# Patient Record
Sex: FEMALE | Race: WHITE | NOT HISPANIC OR LATINO | Employment: OTHER | URBAN - METROPOLITAN AREA
[De-identification: names, ages, dates, MRNs, and addresses within clinical notes are randomized per-mention and may not be internally consistent; named-entity substitution may affect disease eponyms.]

---

## 2019-10-08 NOTE — PROGRESS NOTES
Cardiology Office Visit      Encounter Date:  10/10/2019    Patient ID:   Didi Ceballos is a 74 y.o. female.    Reason For Followup:  CAD  HLD  HTN  HTCVD    Brief Clinical History:  Dear Patience Sanchez    I had the pleasure of seeing Didi Ceballos today. As you are well aware, this is a 74 y.o. female with a known history of coronary occlusive disease. She underwent emergent PTCA and stenting secondary to acute inferior wall myocardial infarction in the past. She has additional history that includes dyslipidemia, hypertension with hypertensive cardiovascular disease, tobacco abuse disorder, and antiplatelet therapy. She presents today for routine followup above conditions.    Interval History:  She denies any chest pain pressure heaviness or tightness she denies any shortness of breath. She denies any syncope or near syncope. She reports feeling quite well from a cardiac perspective.    She reports that she has been reluctant to get routine health screening such as colonoscopies, and Pap smears.  She states she has gotten mammograms.  We discussed the importance of these preventative measures.  She states she will reach out to you to get scheduled.    We reviewed her most recent laboratory values that were obtained in January of this year.    Assessment & Plan    Impressions:  Coronary artery disease status post PTCA and drug-eluting stent placement in the right coronary artery with 2 XIENCE V drug-eluting stents.  Residual coronary arterial disease involving the circumflex. There are 2-70% lesions noted in a branch with acute angulation.  Dyslipidemia  Antiplatelet therapy.  Hypertension with hypertensive cardiovascular disease. Suboptimally controlled today.  Rash of uncertain etiology.  dermatology feels is drug exanthem.   Tobacco abuse disorder    Recommendations:  Continuation of her current medical regimen at the present time.  Followup in 6 months time sooner should she have any difficulties.  Smoking  cessation.  Basic labs annually including CBC Chem-7 lipids and LFTs.  Patient was encouraged to get routine preventative screenings.       Objective:    Vitals:  Vitals:    10/10/19 0917   BP: 134/81   Pulse: 77   Resp: 18   SpO2: 96%   Weight: 68.8 kg (151 lb 9.6 oz)       Physical Exam:    General: Alert, cooperative, no distress, appears stated age  Head:  Normocephalic, atraumatic, mucous membranes moist  Eyes:  Conjunctiva/corneas clear, EOM's intact     Neck:  Supple,  no adenopathy;      Lungs: Coarse and diminished at the bases  Chest wall: No tenderness  Heart::  Regular rate and rhythm, S1 and S2 normal, 1/6 holosystolic murmur.  No rub or gallop  Abdomen: Soft, non-tender, nondistended bowel sounds active  Extremities: No cyanosis, clubbing, or edema  Pulses: 2+ and symmetric all extremities  Skin:  No rashes or lesions  Neuro/psych: A&O x3. CN II through XII are grossly intact with appropriate affect      Allergies:  No Known Allergies    Medication Review:     Current Outpatient Medications:   •  aspirin 81 MG tablet, Take 81 mg by mouth Daily., Disp: , Rfl:   •  clopidogrel (PLAVIX) 75 MG tablet, Take 75 mg by mouth Daily., Disp: , Rfl: 3  •  metoprolol succinate XL (TOPROL-XL) 25 MG 24 hr tablet, Take 1 tablet by mouth Daily., Disp: , Rfl:   •  rosuvastatin (CRESTOR) 10 MG tablet, Take 10 mg by mouth Daily., Disp: , Rfl: 3    Family History:  Family History   Problem Relation Age of Onset   • No Known Problems Mother    • Heart attack Father    • Heart attack Sister    • Heart attack Brother        Past Medical History:  Past Medical History:   Diagnosis Date   • Antiplatelet or antithrombotic long-term use    • Coronary artery disease    • Dyslipidemia    • Hypertension    • Hypertensive cardiovascular disease    • Kidney stones    • Myocardial infarction (CMS/HCC)        Past surgical History:  Past Surgical History:   Procedure Laterality Date   • BACK SURGERY     • CORONARY ANGIOPLASTY WITH STENT  PLACEMENT      RCA- 2 Xience drug eluting stents   • KIDNEY STONE SURGERY      x 2       Social History:  Social History     Socioeconomic History   • Marital status:      Spouse name: Not on file   • Number of children: Not on file   • Years of education: Not on file   • Highest education level: Not on file   Tobacco Use   • Smoking status: Current Every Day Smoker     Packs/day: 1.00     Years: 18.00     Pack years: 18.00     Types: Cigarettes   • Smokeless tobacco: Never Used   Substance and Sexual Activity   • Alcohol use: No     Frequency: Never   • Drug use: No       Review of Systems:  The following systems were reviewed as they relate to the cardiovascular system: Constitutional, Eyes, ENT, Cardiovascular, Respiratory, Gastrointestinal, Integumentary, Neurological, Psychiatric, Hematologic, Endocrine, Musculoskeletal, and Genitourinary. The pertinent cardiovascular findings are reported above with all other cardiovascular points within those systems being negative.    Diagnostic Study Review:     Current Electrocardiogram:    ECG 12 Lead  Date/Time: 10/10/2019 9:52 AM  Performed by: Sushil Cantu DO  Authorized by: Sushil Cantu DO   Comparison: not compared with previous ECG   Previous ECG: no previous ECG available  Comments: Normal sinus rhythm with a ventricular rate of 71 bpm.  Ventricular premature complex.  Consider anterolateral MI.  Normal QT and QTc intervals.  Left axis deviation.              NOTE: The following portions of the patient's history were reviewed and updated this visit as appropriate: allergies, current medications, past family history, past medical history, past social history, past surgical history and problem list.

## 2019-10-10 ENCOUNTER — OFFICE VISIT (OUTPATIENT)
Dept: CARDIOLOGY | Facility: CLINIC | Age: 74
End: 2019-10-10

## 2019-10-10 VITALS
HEART RATE: 77 BPM | WEIGHT: 151.6 LBS | SYSTOLIC BLOOD PRESSURE: 134 MMHG | DIASTOLIC BLOOD PRESSURE: 81 MMHG | RESPIRATION RATE: 18 BRPM | BODY MASS INDEX: 27.73 KG/M2 | OXYGEN SATURATION: 96 %

## 2019-10-10 DIAGNOSIS — I10 ESSENTIAL HYPERTENSION: ICD-10-CM

## 2019-10-10 DIAGNOSIS — I25.10 CORONARY ARTERY DISEASE INVOLVING NATIVE CORONARY ARTERY OF NATIVE HEART WITHOUT ANGINA PECTORIS: Primary | ICD-10-CM

## 2019-10-10 DIAGNOSIS — Z79.02 LONG TERM CURRENT USE OF ANTITHROMBOTICS/ANTIPLATELETS: ICD-10-CM

## 2019-10-10 DIAGNOSIS — F17.200 TOBACCO DEPENDENCE SYNDROME: ICD-10-CM

## 2019-10-10 DIAGNOSIS — E78.00 HYPERCHOLESTEROLEMIA: ICD-10-CM

## 2019-10-10 DIAGNOSIS — I11.9 HYPERTENSIVE HEART DISEASE WITHOUT HEART FAILURE: ICD-10-CM

## 2019-10-10 DIAGNOSIS — E78.5 DYSLIPIDEMIA: ICD-10-CM

## 2019-10-10 PROCEDURE — 99214 OFFICE O/P EST MOD 30 MIN: CPT | Performed by: INTERNAL MEDICINE

## 2019-10-10 PROCEDURE — 93000 ELECTROCARDIOGRAM COMPLETE: CPT | Performed by: INTERNAL MEDICINE

## 2019-10-10 RX ORDER — CLOPIDOGREL BISULFATE 75 MG/1
75 TABLET ORAL DAILY
Refills: 3 | COMMUNITY
Start: 2019-09-09 | End: 2020-06-15

## 2019-10-10 RX ORDER — ROSUVASTATIN CALCIUM 10 MG/1
10 TABLET, COATED ORAL DAILY
Refills: 3 | COMMUNITY
Start: 2019-09-09 | End: 2020-06-15

## 2019-10-10 RX ORDER — METOPROLOL SUCCINATE 25 MG/1
1 TABLET, EXTENDED RELEASE ORAL EVERY 24 HOURS
COMMUNITY
Start: 2019-02-26 | End: 2020-06-15

## 2020-04-15 NOTE — PROGRESS NOTES
Cardiology Office Visit      Encounter Date:  04/16/2020    Patient ID:   Didi Ceballos is a 74 y.o. female.    Reason For Followup:  CAD  HLD  HTN  HTCVD    Brief Clinical History:  Dear Patience Shaver APRN    I had the pleasure of seeing Didi Ceballos today. As you are well aware, this is a 74 y.o. female with a known history of coronary occlusive disease. She underwent emergent PTCA and stenting secondary to acute inferior wall myocardial infarction in the past. She has additional history that includes dyslipidemia, hypertension with hypertensive cardiovascular disease, tobacco abuse disorder, and antiplatelet therapy. She presents today for routine followup above conditions.     Interval History:  She denies any chest pain pressure heaviness or tightness she denies any shortness of breath. She denies any syncope or near syncope. She reports feeling quite well from a cardiac perspective.     She continues to report being reluctant to get routine health screening such as colonoscopies, and Pap smears.  She states she has gotten mammograms.  We discussed the importance of these preventative measures.  She states she will reach out to you to get scheduled once the coronavirus outbreak has subsided.    Her blood pressure is elevated today however she reports her home value this morning was 118/78.     Assessment & Plan     Impressions:  Coronary artery disease status post PTCA and drug-eluting stent placement in the right coronary artery with 2 XIENCE V drug-eluting stents.  Residual coronary arterial disease involving the circumflex. There are 2-70% lesions noted in a branch with acute angulation.  Dyslipidemia  Antiplatelet therapy.  Hypertension with hypertensive cardiovascular disease. Suboptimally controlled today.  Rash of uncertain etiology.  dermatology feels is drug exanthem.   Tobacco abuse disorder     Recommendations:  Continuation of her current medical regimen at the present time.  Followup in 6  months time sooner should she have any difficulties.  Smoking cessation.  Basic labs annually including CBC Chem-7 lipids and LFTs.  Patient was encouraged to get routine preventative screenings.       Objective:    Vitals:  Vitals:    04/16/20 0904   BP: 144/84   Pulse: 73   SpO2: 97%   Weight: 68.5 kg (151 lb)       Physical Exam:    General:          Alert, cooperative, no distress, appears stated age  Head:              Normocephalic, atraumatic, mucous membranes moist  Eyes:               Conjunctiva/corneas clear, EOM's intact     Neck:              Supple,  no adenopathy;      Lungs:            Coarse and diminished at the bases  Chest wall:     No tenderness  Heart::             Regular rate and rhythm, S1 and S2 normal, 1/6 holosystolic murmur.  No rub or gallop  Abdomen:      Soft, non-tender, nondistended bowel sounds active  Extremities:    No cyanosis, clubbing, or edema  Pulses:           2+ and symmetric all extremities  Skin:                No rashes or lesions  Neuro/psych: A&O x3. CN II through XII are grossly intact with appropriate affect      Allergies:  No Known Allergies    Medication Review:     Current Outpatient Medications:   •  aspirin 81 MG tablet, Take 81 mg by mouth Daily., Disp: , Rfl:   •  clopidogrel (PLAVIX) 75 MG tablet, Take 75 mg by mouth Daily., Disp: , Rfl: 3  •  metoprolol succinate XL (TOPROL-XL) 25 MG 24 hr tablet, Take 1 tablet by mouth Daily., Disp: , Rfl:   •  rosuvastatin (CRESTOR) 10 MG tablet, Take 10 mg by mouth Daily., Disp: , Rfl: 3    Family History:  Family History   Problem Relation Age of Onset   • No Known Problems Mother    • Heart attack Father    • Heart attack Sister    • Heart attack Brother        Past Medical History:  Past Medical History:   Diagnosis Date   • Antiplatelet or antithrombotic long-term use    • Coronary artery disease    • Dyslipidemia    • Hypertension    • Hypertensive cardiovascular disease    • Kidney stones    • Myocardial  infarction (CMS/HCC)        Past surgical History:  Past Surgical History:   Procedure Laterality Date   • BACK SURGERY     • CORONARY ANGIOPLASTY WITH STENT PLACEMENT      RCA- 2 Xience drug eluting stents   • KIDNEY STONE SURGERY      x 2       Social History:  Social History     Socioeconomic History   • Marital status:      Spouse name: Not on file   • Number of children: Not on file   • Years of education: Not on file   • Highest education level: Not on file   Tobacco Use   • Smoking status: Current Every Day Smoker     Packs/day: 1.00     Years: 18.00     Pack years: 18.00     Types: Cigarettes   • Smokeless tobacco: Never Used   Substance and Sexual Activity   • Alcohol use: No     Frequency: Never   • Drug use: No       Review of Systems:  The following systems were reviewed as they relate to the cardiovascular system: Constitutional, Eyes, ENT, Cardiovascular, Respiratory, Gastrointestinal, Integumentary, Neurological, Psychiatric, Hematologic, Endocrine, Musculoskeletal, and Genitourinary. The pertinent cardiovascular findings are reported above with all other cardiovascular points within those systems being negative.    Diagnostic Study Review:     Current Electrocardiogram:    ECG 12 Lead  Date/Time: 4/16/2020 9:53 AM  Performed by: Sushil Cantu DO  Authorized by: Sushil Cantu DO   Comparison: not compared with previous ECG   Previous ECG: no previous ECG available  Comments: Normal sinus rhythm with a ventricular rate of 73 bpm.  PVC.  Consider left atrial enlargement.  Consider old inferior MI.  Normal QT and QTc intervals.  Normal QRS axis.              NOTE: The following portions of the patient's history were reviewed and updated this visit as appropriate: allergies, current medications, past family history, past medical history, past social history, past surgical history and problem list.

## 2020-04-16 ENCOUNTER — OFFICE VISIT (OUTPATIENT)
Dept: CARDIOLOGY | Facility: CLINIC | Age: 75
End: 2020-04-16

## 2020-04-16 VITALS
WEIGHT: 151 LBS | BODY MASS INDEX: 27.62 KG/M2 | DIASTOLIC BLOOD PRESSURE: 84 MMHG | HEART RATE: 73 BPM | OXYGEN SATURATION: 97 % | SYSTOLIC BLOOD PRESSURE: 144 MMHG

## 2020-04-16 DIAGNOSIS — E78.00 HYPERCHOLESTEROLEMIA: ICD-10-CM

## 2020-04-16 DIAGNOSIS — I10 ESSENTIAL HYPERTENSION: ICD-10-CM

## 2020-04-16 DIAGNOSIS — F17.200 TOBACCO DEPENDENCE SYNDROME: ICD-10-CM

## 2020-04-16 DIAGNOSIS — Z79.02 LONG TERM CURRENT USE OF ANTITHROMBOTICS/ANTIPLATELETS: ICD-10-CM

## 2020-04-16 DIAGNOSIS — I11.9 HYPERTENSIVE HEART DISEASE WITHOUT HEART FAILURE: ICD-10-CM

## 2020-04-16 DIAGNOSIS — I25.10 CORONARY ARTERY DISEASE INVOLVING NATIVE CORONARY ARTERY OF NATIVE HEART WITHOUT ANGINA PECTORIS: Primary | ICD-10-CM

## 2020-04-16 PROCEDURE — 99214 OFFICE O/P EST MOD 30 MIN: CPT | Performed by: INTERNAL MEDICINE

## 2020-06-15 RX ORDER — CLOPIDOGREL BISULFATE 75 MG/1
TABLET ORAL
Qty: 90 TABLET | Refills: 1 | Status: SHIPPED | OUTPATIENT
Start: 2020-06-15 | End: 2020-12-21

## 2020-06-15 RX ORDER — METOPROLOL SUCCINATE 25 MG/1
TABLET, EXTENDED RELEASE ORAL
Qty: 90 TABLET | Refills: 1 | Status: SHIPPED | OUTPATIENT
Start: 2020-06-15 | End: 2020-12-21

## 2020-06-15 RX ORDER — ROSUVASTATIN CALCIUM 10 MG/1
TABLET, COATED ORAL
Qty: 90 TABLET | Refills: 1 | Status: SHIPPED | OUTPATIENT
Start: 2020-06-15 | End: 2020-12-21

## 2020-10-19 PROBLEM — Z72.0 CURRENT TOBACCO USE: Status: ACTIVE | Noted: 2020-10-19

## 2020-10-26 NOTE — PROGRESS NOTES
Cardiology Office Visit      Encounter Date:  10/27/2020    Patient ID:   Didi Ceballos is a 75 y.o. female.    Reason For Followup:  CAD  HLD  HTN  HTCVD    Brief Clinical History:  Dear Patience Shaver APRN    I had the pleasure of seeing Didi Ceballos today. As you are well aware, this is a 75 y.o. female with a known history of coronary occlusive disease. She underwent emergent PTCA and stenting secondary to acute inferior wall myocardial infarction in the past. She has additional history that includes dyslipidemia, hypertension with hypertensive cardiovascular disease, tobacco abuse disorder, and antiplatelet therapy. She presents today for routine followup above conditions.     Interval History:  She denies any chest pain pressure heaviness or tightness she denies any shortness of breath. She denies any syncope or near syncope. She reports feeling quite well from a cardiac perspective.     She continues to report being reluctant to get routine health screening such as colonoscopies, and Pap smears.  She states she has gotten mammograms.  We have discussed the importance of these preventative measures in the past.    Her blood pressure is elevated in the office today however this morning she reports her systolic blood pressure was 97.  She reports no recent blood work.  She continues to smoke three quarters of a pack a day.  We discussed the importance of smoking cessation.     Assessment & Plan     Impressions:  Coronary artery disease status post PTCA and drug-eluting stent placement in the right coronary artery with 2 XIENCE V drug-eluting stents.  Residual coronary arterial disease involving the circumflex. There are 2-70% lesions noted in a branch with acute angulation.  Dyslipidemia  Antiplatelet therapy.  Hypertension with hypertensive cardiovascular disease. Suboptimally controlled today.  Rash of uncertain etiology.  dermatology feels is drug exanthem.   Tobacco abuse  disorder     Recommendations:  Continuation of her current medical regimen at the present time.  Followup in 6 months time sooner should she have any difficulties.  Smoking cessation.  Basic labs annually including CBC Chem-7 lipids and LFTs.  Patient was encouraged to get routine preventative screenings.       Objective:    Vitals:  Vitals:    10/27/20 0935   BP: 149/80   Pulse: 77   SpO2: 97%   Weight: 68.5 kg (151 lb)       Physical Exam:    General:          Alert, cooperative, no distress, appears stated age  Head:              Normocephalic, atraumatic, mucous membranes moist  Eyes:               Conjunctiva/corneas clear, EOM's intact     Neck:              Supple,  no bruit  Lungs:            Coarse and diminished at the bases  Chest wall:     No tenderness  Heart::             Regular rate and rhythm, S1 and S2 normal, 1/6 holosystolic murmur.  No rub or gallop  Abdomen:      Soft, non-tender, nondistended bowel sounds active  Extremities:    No cyanosis, clubbing, or edema  Pulses:           2+ and symmetric all extremities  Skin:                No rashes or lesions  Neuro/psych: A&O x3. CN II through XII are grossly intact with appropriate affect      Allergies:  No Known Allergies    Medication Review:     Current Outpatient Medications:   •  aspirin 81 MG tablet, Take 81 mg by mouth Daily., Disp: , Rfl:   •  clopidogrel (PLAVIX) 75 MG tablet, Take 1 tablet by mouth once daily, Disp: 90 tablet, Rfl: 1  •  metoprolol succinate XL (TOPROL-XL) 25 MG 24 hr tablet, Take 1 tablet by mouth once daily, Disp: 90 tablet, Rfl: 1  •  rosuvastatin (CRESTOR) 10 MG tablet, Take 1 tablet by mouth once daily, Disp: 90 tablet, Rfl: 1    Family History:  Family History   Problem Relation Age of Onset   • No Known Problems Mother    • Heart attack Father    • Heart attack Sister    • Heart attack Brother        Past Medical History:  Past Medical History:   Diagnosis Date   • Antiplatelet or antithrombotic long-term use     • Coronary artery disease    • Dyslipidemia    • Hypertension    • Hypertensive cardiovascular disease    • Kidney stones    • Myocardial infarction (CMS/HCC)        Past surgical History:  Past Surgical History:   Procedure Laterality Date   • BACK SURGERY     • CORONARY ANGIOPLASTY WITH STENT PLACEMENT      RCA- 2 Xience drug eluting stents   • KIDNEY STONE SURGERY      x 2       Social History:  Social History     Socioeconomic History   • Marital status:      Spouse name: Not on file   • Number of children: Not on file   • Years of education: Not on file   • Highest education level: Not on file   Tobacco Use   • Smoking status: Current Every Day Smoker     Packs/day: 1.00     Years: 18.00     Pack years: 18.00     Types: Cigarettes   • Smokeless tobacco: Never Used   Substance and Sexual Activity   • Alcohol use: No     Frequency: Never   • Drug use: No       Review of Systems:  The following systems were reviewed as they relate to the cardiovascular system: Constitutional, Eyes, ENT, Cardiovascular, Respiratory, Gastrointestinal, Integumentary, Neurological, Psychiatric, Hematologic, Endocrine, Musculoskeletal, and Genitourinary. The pertinent cardiovascular findings are reported above with all other cardiovascular points within those systems being negative.    Diagnostic Study Review:     Current Electrocardiogram:    ECG 12 Lead    Date/Time: 10/27/2020 10:03 AM  Performed by: Sushil Cantu DO  Authorized by: Sushil Cantu DO   Comparison: not compared with previous ECG   Previous ECG: no previous ECG available  Comments: Normal sinus rhythm with a ventricular rate of 83 bpm.  Low voltage in the precordial leads.  Normal QT and QTc intervals.  Normal QRS axis.              NOTE: The following portions of the patient's history were reviewed and updated this visit as appropriate: allergies, current medications, past family history, past medical history, past social  history, past surgical history and problem list.

## 2020-10-27 ENCOUNTER — OFFICE VISIT (OUTPATIENT)
Dept: CARDIOLOGY | Facility: CLINIC | Age: 75
End: 2020-10-27

## 2020-10-27 VITALS
WEIGHT: 151 LBS | BODY MASS INDEX: 27.62 KG/M2 | SYSTOLIC BLOOD PRESSURE: 149 MMHG | HEART RATE: 77 BPM | DIASTOLIC BLOOD PRESSURE: 80 MMHG | OXYGEN SATURATION: 97 %

## 2020-10-27 DIAGNOSIS — E78.00 HYPERCHOLESTEROLEMIA: ICD-10-CM

## 2020-10-27 DIAGNOSIS — Z79.02 LONG TERM CURRENT USE OF ANTITHROMBOTICS/ANTIPLATELETS: ICD-10-CM

## 2020-10-27 DIAGNOSIS — Z00.00 WELLNESS EXAMINATION: ICD-10-CM

## 2020-10-27 DIAGNOSIS — E78.5 DYSLIPIDEMIA: ICD-10-CM

## 2020-10-27 DIAGNOSIS — I10 ESSENTIAL HYPERTENSION: ICD-10-CM

## 2020-10-27 DIAGNOSIS — I11.9 HYPERTENSIVE HEART DISEASE WITHOUT HEART FAILURE: ICD-10-CM

## 2020-10-27 DIAGNOSIS — I25.10 CORONARY ARTERY DISEASE INVOLVING NATIVE CORONARY ARTERY OF NATIVE HEART WITHOUT ANGINA PECTORIS: Primary | ICD-10-CM

## 2020-10-27 DIAGNOSIS — F17.200 TOBACCO DEPENDENCE SYNDROME: ICD-10-CM

## 2020-10-27 PROCEDURE — 99214 OFFICE O/P EST MOD 30 MIN: CPT | Performed by: INTERNAL MEDICINE

## 2020-10-27 PROCEDURE — 93000 ELECTROCARDIOGRAM COMPLETE: CPT | Performed by: INTERNAL MEDICINE

## 2020-12-21 RX ORDER — CLOPIDOGREL BISULFATE 75 MG/1
TABLET ORAL
Qty: 90 TABLET | Refills: 1 | Status: SHIPPED | OUTPATIENT
Start: 2020-12-21 | End: 2021-06-29

## 2020-12-21 RX ORDER — ROSUVASTATIN CALCIUM 10 MG/1
TABLET, COATED ORAL
Qty: 90 TABLET | Refills: 1 | Status: SHIPPED | OUTPATIENT
Start: 2020-12-21 | End: 2021-06-29

## 2020-12-21 RX ORDER — METOPROLOL SUCCINATE 25 MG/1
TABLET, EXTENDED RELEASE ORAL
Qty: 90 TABLET | Refills: 1 | Status: SHIPPED | OUTPATIENT
Start: 2020-12-21 | End: 2021-06-29

## 2021-04-27 ENCOUNTER — OFFICE VISIT (OUTPATIENT)
Dept: CARDIOLOGY | Facility: CLINIC | Age: 76
End: 2021-04-27

## 2021-04-27 VITALS
HEIGHT: 62 IN | SYSTOLIC BLOOD PRESSURE: 137 MMHG | HEART RATE: 70 BPM | RESPIRATION RATE: 18 BRPM | BODY MASS INDEX: 28.16 KG/M2 | WEIGHT: 153 LBS | OXYGEN SATURATION: 97 % | DIASTOLIC BLOOD PRESSURE: 74 MMHG

## 2021-04-27 DIAGNOSIS — I11.9 HYPERTENSIVE HEART DISEASE WITHOUT HEART FAILURE: ICD-10-CM

## 2021-04-27 DIAGNOSIS — I10 ESSENTIAL HYPERTENSION: ICD-10-CM

## 2021-04-27 DIAGNOSIS — I25.10 CORONARY ARTERY DISEASE INVOLVING NATIVE CORONARY ARTERY OF NATIVE HEART WITHOUT ANGINA PECTORIS: Primary | ICD-10-CM

## 2021-04-27 DIAGNOSIS — G62.9 NEUROPATHY: ICD-10-CM

## 2021-04-27 DIAGNOSIS — E78.5 DYSLIPIDEMIA: ICD-10-CM

## 2021-04-27 DIAGNOSIS — Z79.02 LONG TERM CURRENT USE OF ANTITHROMBOTICS/ANTIPLATELETS: ICD-10-CM

## 2021-04-27 PROCEDURE — 93000 ELECTROCARDIOGRAM COMPLETE: CPT | Performed by: INTERNAL MEDICINE

## 2021-04-27 PROCEDURE — 99214 OFFICE O/P EST MOD 30 MIN: CPT | Performed by: INTERNAL MEDICINE

## 2021-04-27 NOTE — PROGRESS NOTES
Cardiology Office Visit      Encounter Date:  04/27/2021    Patient ID:   Didi Ceballos is a 75 y.o. female.    Reason For Followup:  Coronary artery disease  Hypertension  Hyperlipidemia  Hypertensive cardiovascular disease     Brief Clinical History:  Dear Patience Shaver APRN    I had the pleasure of seeing Didi Ceballos today. As you are well aware, this is a 75 y.o. female with a known history of coronary occlusive disease. She underwent emergent PTCA and stenting secondary to acute inferior wall myocardial infarction in the past. She has additional history that includes dyslipidemia, hypertension with hypertensive cardiovascular disease, tobacco abuse disorder, and antiplatelet therapy. She presents today for routine followup above conditions.     Interval History:  She denies any chest pain pressure heaviness or tightness she denies any shortness of breath. She denies any syncope or near syncope. She reports feeling quite well from a cardiac perspective.     Her blood pressure is better today than it has been on previous visits.  She reports good control at home.    She is reporting some numbness in the thumb, index finger, and middle fingers on both hands.  We will make a referral to neurology for nerve conduction studies.    We reviewed her most recent laboratory data from October 2020.  Renal function is within normal limits.     Assessment & Plan     Impressions:  Coronary artery disease status post PTCA and drug-eluting stent placement in the right coronary artery with 2 XIENCE V drug-eluting stents.  Residual coronary arterial disease involving the circumflex. There are 2-70% lesions noted in a branch with acute angulation.  Dyslipidemia  Antiplatelet therapy.  Hypertension with hypertensive cardiovascular disease. Suboptimally controlled today.  Rash of uncertain etiology.  dermatology feels is drug exanthem.   Tobacco abuse disorder  Bilateral hand numbness     Recommendations:  Continuation of her  "current medical regimen  Neurology referral  Follow-up in 6 months time sooner should there be difficulties.       Objective:    Vitals:  Vitals:    04/27/21 1026   BP: 137/74   BP Location: Left arm   Patient Position: Sitting   Cuff Size: Large Adult   Pulse: 70   Resp: 18   SpO2: 97%   Weight: 69.4 kg (153 lb)   Height: 157.5 cm (62\")       Physical Exam:    General:          Alert, cooperative, no distress, appears stated age  Head:              Normocephalic, atraumatic, mucous membranes moist  Eyes:               Conjunctiva/corneas clear, EOM's intact     Neck:              Supple,  no bruit  Lungs:            Coarse and diminished at the bases  Chest wall:     No tenderness  Heart::             Regular rate and rhythm, S1 and S2 normal, 1/6 holosystolic murmur.  No rub or gallop  Abdomen:      Soft, non-tender, nondistended bowel sounds active  Extremities:    No cyanosis, clubbing, or edema  Pulses:           2+ and symmetric all extremities  Skin:                No rashes or lesions  Neuro/psych: A&O x3. CN II through XII are grossly intact with appropriate affect      Allergies:  No Known Allergies    Medication Review:     Current Outpatient Medications:   •  aspirin 81 MG tablet, Take 81 mg by mouth Daily., Disp: , Rfl:   •  clopidogrel (PLAVIX) 75 MG tablet, Take 1 tablet by mouth once daily, Disp: 90 tablet, Rfl: 1  •  metoprolol succinate XL (TOPROL-XL) 25 MG 24 hr tablet, Take 1 tablet by mouth once daily, Disp: 90 tablet, Rfl: 1  •  rosuvastatin (CRESTOR) 10 MG tablet, Take 1 tablet by mouth once daily, Disp: 90 tablet, Rfl: 1    Family History:  Family History   Problem Relation Age of Onset   • No Known Problems Mother    • Heart attack Father    • Heart attack Sister    • Heart attack Brother        Past Medical History:  Past Medical History:   Diagnosis Date   • Antiplatelet or antithrombotic long-term use    • Coronary artery disease    • Dyslipidemia    • Hypertension    • Hypertensive " cardiovascular disease    • Kidney stones    • Myocardial infarction (CMS/HCC)        Past surgical History:  Past Surgical History:   Procedure Laterality Date   • BACK SURGERY     • CORONARY ANGIOPLASTY WITH STENT PLACEMENT      RCA- 2 Xience drug eluting stents   • KIDNEY STONE SURGERY      x 2       Social History:  Social History     Socioeconomic History   • Marital status:      Spouse name: Not on file   • Number of children: Not on file   • Years of education: Not on file   • Highest education level: Not on file   Tobacco Use   • Smoking status: Current Every Day Smoker     Packs/day: 1.00     Years: 18.00     Pack years: 18.00     Types: Cigarettes   • Smokeless tobacco: Never Used   Vaping Use   • Vaping Use: Never used   Substance and Sexual Activity   • Alcohol use: No   • Drug use: No       Review of Systems:  The following systems were reviewed as they relate to the cardiovascular system: Constitutional, Eyes, ENT, Cardiovascular, Respiratory, Gastrointestinal, Integumentary, Neurological, Psychiatric, Hematologic, Endocrine, Musculoskeletal, and Genitourinary. The pertinent cardiovascular findings are reported above with all other cardiovascular points within those systems being negative.    Diagnostic Study Review:     Current Electrocardiogram:    ECG 12 Lead    Date/Time: 4/27/2021 12:53 PM  Performed by: Sushil Cantu DO  Authorized by: Sushil Cantu DO   Comparison: not compared with previous ECG   Previous ECG: no previous ECG available  Comments: Normal sinus rhythm with a ventricular rate of 71 bpm.  Borderline right axis deviation.  Low voltage in the precordial leads.  Normal QT and QTc intervals.              NOTE: The following portions of the patient's note were reviewed, confirmed and/or updated this visit as appropriate: History of present illness/Interval history, physical examination, assessment & plan, allergies, current medications, past family  history, past medical history, past social history, past surgical history and problem list.

## 2021-06-15 NOTE — PROGRESS NOTES
Subjective: neuropathy    Patient ID: Didi Ceballos is a 75 y.o. female.    CHIEF COMPLAINT: neuropathy    History of Present Illness Ms Ceballos is a very pleasant 75-year-old  female who presented to the clinic for an evaluation of numbness and tingling in both hands.  She reports that her left hand is much more severe than her right.  She reports difficulty picking up cups and small items and reports burning tingling type discomfort in bilateral thumbs index and ring fingers.  She denies any neck pain whatsoever.  She states the discomfort is worse at night and sometimes interferes with her sleep.  She reports the discomfort is gotten progressively worse and if needed she will have surgery.    Complaint: paresthesias  Onset: Left hand onset 1 year, right hand onset 6 months.  Location: thumb,index finger, and middle fingers on both hands, L>R   Quality: feels like-numbness/pain  Severity: 4 day -- 10 at night on scale of 0-10  Duration: couple minutes  Frequency: random  Timing: random  Context: hanging down movement  Modifying factors: hanging down   Associated Signs and symptoms:  numbness,  muscle waisting APB on left hand   Current meds:none    Back  Lumbar OR 77, 78  ocassional flair up on Sciatica    The following portions of the patient's history were reviewed and updated as appropriate: allergies, current medications, past family history, past medical history, past social history, past surgical history and problem list.      Family History   Problem Relation Age of Onset   • No Known Problems Mother    • Heart attack Father    • Heart attack Sister    • Heart attack Brother        Past Medical History:   Diagnosis Date   • Antiplatelet or antithrombotic long-term use    • Coronary artery disease    • Dyslipidemia    • Hypertension    • Hypertensive cardiovascular disease    • Kidney stones    • Myocardial infarction (CMS/[facility])        Social History     Socioeconomic History   • Marital status:       Spouse name: Not on file   • Number of children: Not on file   • Years of education: Not on file   • Highest education level: Not on file   Tobacco Use   • Smoking status: Current Every Day Smoker     Packs/day: 1.00     Years: 18.00     Pack years: 18.00     Types: Cigarettes   • Smokeless tobacco: Never Used   Vaping Use   • Vaping Use: Never used   Substance and Sexual Activity   • Alcohol use: No   • Drug use: No   • Sexual activity: Defer         Current Outpatient Medications:   •  aspirin 81 MG tablet, Take 81 mg by mouth Daily., Disp: , Rfl:   •  clopidogrel (PLAVIX) 75 MG tablet, Take 1 tablet by mouth once daily, Disp: 90 tablet, Rfl: 1  •  metoprolol succinate XL (TOPROL-XL) 25 MG 24 hr tablet, Take 1 tablet by mouth once daily, Disp: 90 tablet, Rfl: 1  •  rosuvastatin (CRESTOR) 10 MG tablet, Take 1 tablet by mouth once daily, Disp: 90 tablet, Rfl: 1    Review of Systems   Constitutional: Negative for fatigue and fever.   HENT: Negative for ear discharge and ear pain.    Eyes: Negative for pain and itching.   Respiratory: Negative for cough and shortness of breath.    Cardiovascular: Negative for chest pain.   Gastrointestinal: Negative for abdominal pain and nausea.   Endocrine: Negative for cold intolerance and heat intolerance.   Genitourinary: Negative for urgency.   Musculoskeletal: Positive for back pain. Negative for neck pain.   Neurological: Negative for dizziness and headaches.   Psychiatric/Behavioral: Negative for agitation and confusion.        I have reviewed ROS completed by medical assistant.     Objective:    Neurologic Exam     Mental Status   Oriented to person, place, and time.     Cranial Nerves     CN III, IV, VI   Pupils are equal, round, and reactive to light.    Gait, Coordination, and Reflexes     Gait  Gait: normal    Reflexes   Right brachioradialis: 2+  Left brachioradialis: 2+  Right biceps: 2+  Left biceps: 2+  Right triceps: 2+  Left triceps: 2+  Right patellar:  2+  Left patellar: 2+  Right achilles: 2+  Left achilles: 2+      Physical Exam  Vitals and nursing note reviewed.   Constitutional:       Appearance: Normal appearance.   HENT:      Head: Normocephalic.      Nose: Nose normal.      Mouth/Throat:      Mouth: Mucous membranes are moist.   Eyes:      Pupils: Pupils are equal, round, and reactive to light.   Cardiovascular:      Rate and Rhythm: Normal rate and regular rhythm.      Pulses: Normal pulses.      Heart sounds: Normal heart sounds, S1 normal and S2 normal.   Pulmonary:      Effort: Pulmonary effort is normal.      Breath sounds: Normal breath sounds.   Musculoskeletal:         General: Normal range of motion.      Cervical back: Normal range of motion.   Skin:     General: Skin is warm.   Neurological:      General: No focal deficit present.      Mental Status: She is alert and oriented to person, place, and time.      Cranial Nerves: Cranial nerves are intact.      Sensory: Sensation is intact.      Motor: Motor function is intact.      Coordination: Coordination is intact.      Gait: Gait is intact.      Deep Tendon Reflexes: Babinski sign absent on the right side. Babinski sign absent on the left side.      Reflex Scores:       Tricep reflexes are 2+ on the right side and 2+ on the left side.       Bicep reflexes are 2+ on the right side and 2+ on the left side.       Brachioradialis reflexes are 2+ on the right side and 2+ on the left side.       Patellar reflexes are 2+ on the right side and 2+ on the left side.       Achilles reflexes are 2+ on the right side and 2+ on the left side.  Psychiatric:         Attention and Perception: Attention and perception normal.         Mood and Affect: Mood normal.         Behavior: Behavior normal. Behavior is cooperative.         Thought Content: Thought content normal.         Judgment: Judgment normal.         Assessment/Plan:    Diagnoses and all orders for this visit:    1. Neuropathy of both upper extremities  (Primary)  Comments:  Neuropathy labs not ordered in entirety due to liklehood of CTS.   Orders:  -     EMG 2 Limbs; Future  -     Vitamin B12  -     CBC & Differential  -     RPR  -     Sedimentation Rate  -     Protein Elec + Interp, Serum        Return in about 3 months (around 9/22/2021) for Next scheduled follow up with Dr Seipel .    I spent 49 minutes caring for Didi on this date of service. This time includes time spent by me in the following activities: obtaining and/or reviewing a separately obtained history, performing a medically appropriate examination and/or evaluation, counseling and educating the patient/family/caregiver, ordering medications, tests, or procedures, referring and communicating with other health care professionals, documenting information in the medical record and independently interpreting results and communicating that information with the patient/family/caregiver.      This document has been electronically signed by Evi DANIEL on June 22, 2021 09:33 EDT

## 2021-06-22 ENCOUNTER — OFFICE VISIT (OUTPATIENT)
Dept: NEUROLOGY | Facility: CLINIC | Age: 76
End: 2021-06-22

## 2021-06-22 VITALS
WEIGHT: 151 LBS | HEIGHT: 62 IN | TEMPERATURE: 96.9 F | DIASTOLIC BLOOD PRESSURE: 83 MMHG | SYSTOLIC BLOOD PRESSURE: 144 MMHG | BODY MASS INDEX: 27.79 KG/M2 | HEART RATE: 80 BPM

## 2021-06-22 DIAGNOSIS — G56.93 NEUROPATHY OF BOTH UPPER EXTREMITIES: Primary | ICD-10-CM

## 2021-06-22 PROCEDURE — 99204 OFFICE O/P NEW MOD 45 MIN: CPT | Performed by: NURSE PRACTITIONER

## 2021-06-29 RX ORDER — METOPROLOL SUCCINATE 25 MG/1
TABLET, EXTENDED RELEASE ORAL
Qty: 90 TABLET | Refills: 0 | Status: SHIPPED | OUTPATIENT
Start: 2021-06-29 | End: 2021-10-01

## 2021-06-29 RX ORDER — ROSUVASTATIN CALCIUM 10 MG/1
TABLET, COATED ORAL
Qty: 90 TABLET | Refills: 0 | Status: SHIPPED | OUTPATIENT
Start: 2021-06-29 | End: 2021-10-01

## 2021-06-29 RX ORDER — CLOPIDOGREL BISULFATE 75 MG/1
TABLET ORAL
Qty: 90 TABLET | Refills: 0 | Status: SHIPPED | OUTPATIENT
Start: 2021-06-29 | End: 2021-10-01

## 2021-07-13 NOTE — PROGRESS NOTES
EMG and Nerve Conduction Studies    The complete report includes the data sheets.     Referring Doctor: ANGELIC BEST    History: This patient is a 76-year-old female who complains of numbness in both hands worse on the left than the right.  There is atrophy of the thenar eminence on the left      Results:    1.  The right median sensory nerve conduction study was attempted with no response recorded.  The right median motor distal latency is prolonged the compound muscle action potential amplitudes are normal the conduction velocity in the forearm is mildly reduced.    2.  The left median sensory study was attempted with no response recorded.  The left median motor distal latency is prolonged the amplitude of the compound muscle action potential is markedly reduced the forearm conduction velocity was normal.    3.  The ulnar sensory distal latency is slightly prolonged bilaterally consistent with a skin temperature the ulnar motor nerve conduction studies were normal.    4.  EMG of the muscles examined in the upper extremities in the C5-6 T1 myotomes were normal except for the abductor pollicis brevis muscles.  The left abductor pollicis brevis muscle and increased insertional activity fibrillations positive sharp waves and a discrete motor unit pattern.  The right abductor pollicis brevis muscle showed increased insertional activity and decreased recruitment.      Impression:    This is an abnormal study.  There is evidence of bilateral median neuropathy at the wrist.  The neuropathy is moderate to severe on the right and severe on the left.  There is no evidence of focal ulnar neuropathy or neurogenic change in the muscles examined in the C5-T1 myotomes except for the median innervated abductor pollicis brevis muscle.      Joseph Seipel, M.D.

## 2021-07-26 ENCOUNTER — PROCEDURE VISIT (OUTPATIENT)
Dept: NEUROLOGY | Facility: CLINIC | Age: 76
End: 2021-07-26

## 2021-07-26 VITALS
HEART RATE: 77 BPM | HEIGHT: 62 IN | TEMPERATURE: 97.5 F | SYSTOLIC BLOOD PRESSURE: 132 MMHG | BODY MASS INDEX: 27.79 KG/M2 | WEIGHT: 151 LBS | DIASTOLIC BLOOD PRESSURE: 80 MMHG

## 2021-07-26 DIAGNOSIS — G56.03 BILATERAL CARPAL TUNNEL SYNDROME: Primary | ICD-10-CM

## 2021-07-26 PROCEDURE — 95886 MUSC TEST DONE W/N TEST COMP: CPT | Performed by: PSYCHIATRY & NEUROLOGY

## 2021-07-26 PROCEDURE — 95910 NRV CNDJ TEST 7-8 STUDIES: CPT | Performed by: PSYCHIATRY & NEUROLOGY

## 2021-07-27 ENCOUNTER — TELEPHONE (OUTPATIENT)
Dept: NEUROLOGY | Facility: CLINIC | Age: 76
End: 2021-07-27

## 2021-07-27 NOTE — PROGRESS NOTES
Notify her that th EMG showed Bilateral Carpal Tunnel and I can refer her to [facility] if she wishes.   Impression:     This is an abnormal study.  There is evidence of bilateral median neuropathy at the wrist.  The neuropathy is moderate to severe on the right and severe on the left.  There is no evidence of focal ulnar neuropathy or neurogenic change in the muscles examined in the C5-T1 myotomes except for the median innervated abductor pollicis brevis muscle.        Joseph Seipel, M.D

## 2021-07-27 NOTE — TELEPHONE ENCOUNTER
----- Message from FRANDY Jara sent at 7/27/2021  1:58 PM EDT -----  Notify her that th EMG showed Bilateral Carpal Tunnel and I can refer her to [facility] if she wishes.   Impression:     This is an abnormal study.  There is evidence of bilateral median neuropathy at the wrist.  The neuropathy is moderate to severe on the right and severe on the left.  There is no evidence of focal ulnar neuropathy or neurogenic change in the muscles   examined in the C5-T1 myotomes except for the median innervated abductor pollicis brevis muscle.        Joseph Seipel, M.D

## 2021-08-27 ENCOUNTER — TELEPHONE (OUTPATIENT)
Dept: NEUROLOGY | Facility: CLINIC | Age: 76
End: 2021-08-27

## 2021-08-27 NOTE — TELEPHONE ENCOUNTER
Caller: Didi Ceballos    Relationship: Self    Best call back number: [phone]    What is the medical concern/diagnosis: BILATERAL CARPAL TUNNEL    What specialty or service is being requested: HAND SPECIALIST    What is the provider, practice or medical service name: [facility]    Any additional details: PT CALLED BACK REQUESTING EMG/NCV RESULTS AS SHE STATED SHE HAD NOT HEARD FROM THE OFFICE. INFORMED HER THAT JAMESON HAD TRIED REACHING OUT TWICE WITH NO SUCCESS. RELAYED RESULTS AS OKAYED BY JAMESON. PT VERBALIZED UNDERSTANDING AND WOULD LIKE TO GO FORWARD WITH REFERRAL TO HAND SPECIALIST.    PLEASE REVIEW AND ADVISE.

## 2021-08-31 DIAGNOSIS — G56.03 BILATERAL CARPAL TUNNEL SYNDROME: Primary | ICD-10-CM

## 2021-10-01 RX ORDER — ROSUVASTATIN CALCIUM 10 MG/1
TABLET, COATED ORAL
Qty: 90 TABLET | Refills: 0 | Status: SHIPPED | OUTPATIENT
Start: 2021-10-01 | End: 2021-12-30

## 2021-10-01 RX ORDER — CLOPIDOGREL BISULFATE 75 MG/1
TABLET ORAL
Qty: 90 TABLET | Refills: 0 | Status: SHIPPED | OUTPATIENT
Start: 2021-10-01 | End: 2021-12-30

## 2021-10-01 RX ORDER — METOPROLOL SUCCINATE 25 MG/1
TABLET, EXTENDED RELEASE ORAL
Qty: 90 TABLET | Refills: 0 | Status: SHIPPED | OUTPATIENT
Start: 2021-10-01 | End: 2021-12-30

## 2021-10-27 ENCOUNTER — OFFICE VISIT (OUTPATIENT)
Dept: CARDIOLOGY | Facility: CLINIC | Age: 76
End: 2021-10-27

## 2021-10-27 VITALS
OXYGEN SATURATION: 96 % | DIASTOLIC BLOOD PRESSURE: 83 MMHG | WEIGHT: 154 LBS | HEART RATE: 81 BPM | HEIGHT: 62 IN | SYSTOLIC BLOOD PRESSURE: 148 MMHG | BODY MASS INDEX: 28.34 KG/M2

## 2021-10-27 DIAGNOSIS — I25.10 CORONARY ARTERY DISEASE INVOLVING NATIVE CORONARY ARTERY OF NATIVE HEART WITHOUT ANGINA PECTORIS: Primary | ICD-10-CM

## 2021-10-27 DIAGNOSIS — I11.9 HYPERTENSIVE HEART DISEASE WITHOUT HEART FAILURE: ICD-10-CM

## 2021-10-27 DIAGNOSIS — E78.00 HYPERCHOLESTEROLEMIA: ICD-10-CM

## 2021-10-27 DIAGNOSIS — I10 PRIMARY HYPERTENSION: ICD-10-CM

## 2021-10-27 DIAGNOSIS — Z79.02 LONG TERM CURRENT USE OF ANTITHROMBOTICS/ANTIPLATELETS: ICD-10-CM

## 2021-10-27 PROCEDURE — 93000 ELECTROCARDIOGRAM COMPLETE: CPT | Performed by: INTERNAL MEDICINE

## 2021-10-27 PROCEDURE — 99214 OFFICE O/P EST MOD 30 MIN: CPT | Performed by: INTERNAL MEDICINE

## 2021-12-30 RX ORDER — CLOPIDOGREL BISULFATE 75 MG/1
TABLET ORAL
Qty: 90 TABLET | Refills: 0 | Status: SHIPPED | OUTPATIENT
Start: 2021-12-30 | End: 2022-04-04

## 2021-12-30 RX ORDER — ROSUVASTATIN CALCIUM 10 MG/1
TABLET, COATED ORAL
Qty: 90 TABLET | Refills: 0 | Status: SHIPPED | OUTPATIENT
Start: 2021-12-30 | End: 2022-04-04

## 2021-12-30 RX ORDER — METOPROLOL SUCCINATE 25 MG/1
TABLET, EXTENDED RELEASE ORAL
Qty: 90 TABLET | Refills: 0 | Status: SHIPPED | OUTPATIENT
Start: 2021-12-30 | End: 2022-04-04

## 2022-04-04 RX ORDER — CLOPIDOGREL BISULFATE 75 MG/1
TABLET ORAL
Qty: 90 TABLET | Refills: 0 | Status: SHIPPED | OUTPATIENT
Start: 2022-04-04 | End: 2022-06-30

## 2022-04-04 RX ORDER — METOPROLOL SUCCINATE 25 MG/1
TABLET, EXTENDED RELEASE ORAL
Qty: 90 TABLET | Refills: 0 | Status: SHIPPED | OUTPATIENT
Start: 2022-04-04 | End: 2022-06-30

## 2022-04-04 RX ORDER — ROSUVASTATIN CALCIUM 10 MG/1
TABLET, COATED ORAL
Qty: 90 TABLET | Refills: 0 | Status: SHIPPED | OUTPATIENT
Start: 2022-04-04 | End: 2022-06-30

## 2022-04-27 ENCOUNTER — OFFICE VISIT (OUTPATIENT)
Dept: CARDIOLOGY | Facility: CLINIC | Age: 77
End: 2022-04-27

## 2022-04-27 VITALS
DIASTOLIC BLOOD PRESSURE: 78 MMHG | OXYGEN SATURATION: 95 % | BODY MASS INDEX: 28.3 KG/M2 | WEIGHT: 153.8 LBS | HEIGHT: 62 IN | SYSTOLIC BLOOD PRESSURE: 132 MMHG | HEART RATE: 76 BPM

## 2022-04-27 DIAGNOSIS — I10 PRIMARY HYPERTENSION: ICD-10-CM

## 2022-04-27 DIAGNOSIS — I25.10 CORONARY ARTERY DISEASE INVOLVING NATIVE CORONARY ARTERY OF NATIVE HEART WITHOUT ANGINA PECTORIS: Primary | ICD-10-CM

## 2022-04-27 DIAGNOSIS — Z79.02 LONG TERM CURRENT USE OF ANTITHROMBOTICS/ANTIPLATELETS: ICD-10-CM

## 2022-04-27 DIAGNOSIS — E78.00 HYPERCHOLESTEROLEMIA: ICD-10-CM

## 2022-04-27 DIAGNOSIS — I11.9 HYPERTENSIVE HEART DISEASE WITHOUT HEART FAILURE: ICD-10-CM

## 2022-04-27 PROCEDURE — 93000 ELECTROCARDIOGRAM COMPLETE: CPT | Performed by: INTERNAL MEDICINE

## 2022-04-27 PROCEDURE — 99214 OFFICE O/P EST MOD 30 MIN: CPT | Performed by: INTERNAL MEDICINE

## 2022-04-27 NOTE — PROGRESS NOTES
Cardiology Office Visit      Encounter Date:  04/27/2022    Patient ID:   Didi Ceballos is a 76 y.o. female.    Reason For Followup:  Coronary artery disease  Hypertension  Hyperlipidemia  Hypertensive cardiovascular disease     Brief Clinical History:  Dear Patience Shaver APRN    I had the pleasure of seeing Didi Ceballos today. As you are well aware, this is a 76 y.o. female with a known history of coronary occlusive disease. She underwent emergent PTCA and stenting secondary to acute inferior wall myocardial infarction in the past. She has additional history that includes dyslipidemia, hypertension with hypertensive cardiovascular disease, tobacco abuse disorder, and antiplatelet therapy. She presents today for routine followup above conditions.     Interval History:  She denies any chest pain pressure heaviness or tightness she denies any shortness of breath. She denies any syncope or near syncope. She reports feeling quite well from a cardiac perspective.    She continues to smoke.  She reports that she is under a pack a day consistently and some days she is about a half a pack a day.    She had carpal tunnel release in December and reports she has had some improvement although there still is some numbness in her fingers.    She had labs performed in November 2021.  We reviewed those today.  Hemoglobin and hematocrit are 16.4 and 49.8 respectively.  Potassium was 4.7.  BUN and creatinine are 15 and 0.9 respectively.  Hepatic function panel was within normal limits.  Total cholesterol 162, triglycerides 121, LDL 84, HDL 54.      Assessment & Plan     Impressions:  Coronary artery disease status post PTCA and drug-eluting stent placement in the right coronary artery with 2 XIENCE drug-eluting stents.  Residual coronary arterial disease involving the circumflex. There are 2-70% lesions noted in a branch with acute angulation.  Dyslipidemia  Antiplatelet therapy.  Hypertension with hypertensive cardiovascular  "disease. Suboptimally controlled today.  Tobacco abuse disorder  Bilateral hand numbness s/p carpel tunnel release     Recommendations:  Continuation of her current medical regimen     This includes antiplatelet, antihypertensives, and statin  Follow-up in 6 months time sooner should there be difficulties.    Diagnoses and all orders for this visit:    1. Coronary artery disease involving native coronary artery of native heart without angina pectoris (Primary)  -     ECG 12 Lead    2. Hypercholesterolemia  -     ECG 12 Lead    3. Primary hypertension  -     ECG 12 Lead    4. Hypertensive heart disease without heart failure  -     ECG 12 Lead    5. Long term current use of antithrombotics/antiplatelets  -     ECG 12 Lead          Objective:    Vitals:  Vitals:    04/27/22 0953   BP: 132/78   Pulse: 76   SpO2: 95%   Weight: 69.8 kg (153 lb 12.8 oz)   Height: 157.5 cm (62\")     Body mass index is 28.13 kg/m².      Physical Exam:    General: Alert, cooperative, no distress, appears stated age  Head:  Normocephalic, atraumatic, mucous membranes moist  Eyes:  Conjunctiva/corneas clear, EOM's intact     Neck:  Supple,  no bruit    Lungs: Clear to auscultation bilaterally, no wheezes rhonchi rales are noted  Chest wall: No tenderness  Heart::  Regular rate and rhythm, S1 and S2 normal, 1/6 holosystolic murmur.  No rub or gallop  Abdomen: Soft, non-tender, nondistended bowel sounds active  Extremities: No cyanosis, clubbing, or edema  Pulses: 2+ and symmetric all extremities  Skin:  No rashes or lesions  Neuro/psych: A&O x3. CN II through XII are grossly intact with appropriate affect      Allergies:  No Known Allergies    Medication Review:     Current Outpatient Medications:   •  aspirin 81 MG tablet, Take 81 mg by mouth Daily., Disp: , Rfl:   •  clopidogrel (PLAVIX) 75 MG tablet, Take 1 tablet by mouth once daily, Disp: 90 tablet, Rfl: 0  •  metoprolol succinate XL (TOPROL-XL) 25 MG 24 hr tablet, Take 1 tablet by mouth " once daily, Disp: 90 tablet, Rfl: 0  •  rosuvastatin (CRESTOR) 10 MG tablet, Take 1 tablet by mouth once daily, Disp: 90 tablet, Rfl: 0    Family History:  Family History   Problem Relation Age of Onset   • No Known Problems Mother    • Heart attack Father    • Heart attack Sister    • Heart attack Brother        Past Medical History:  Past Medical History:   Diagnosis Date   • Antiplatelet or antithrombotic long-term use    • Coronary artery disease    • Dyslipidemia    • Hyperlipidemia    • Hypertension    • Hypertensive cardiovascular disease    • Kidney stones    • Myocardial infarction (HCC)        Past Surgical History:  Past Surgical History:   Procedure Laterality Date   • BACK SURGERY     • CARDIAC CATHETERIZATION     • CARPAL TUNNEL RELEASE Left 12/2021   • CORONARY ANGIOPLASTY WITH STENT PLACEMENT      RCA- 2 Xience drug eluting stents   • KIDNEY STONE SURGERY      x 2       Social History:  Social History     Socioeconomic History   • Marital status:    Tobacco Use   • Smoking status: Current Every Day Smoker     Packs/day: 1.00     Years: 18.00     Pack years: 18.00     Types: Cigarettes   • Smokeless tobacco: Never Used   Vaping Use   • Vaping Use: Never used   Substance and Sexual Activity   • Alcohol use: No   • Drug use: No   • Sexual activity: Defer       Review of Systems:  The following systems were reviewed as they relate to the cardiovascular system: Constitutional, Eyes, ENT, Cardiovascular, Respiratory, Gastrointestinal, Integumentary, Neurological, Psychiatric, Hematologic, Endocrine, Musculoskeletal, and Genitourinary. The pertinent cardiovascular findings are reported above with all other cardiovascular points within those systems being negative.    Diagnostic Study Review:     Current Electrocardiogram:    ECG 12 Lead    Date/Time: 4/27/2022 10:14 AM  Performed by: Sushil Cantu DO  Authorized by: Sushil Cantu DO   Comparison: not compared with  previous ECG   Previous ECG: no previous ECG available  Comments: Normal sinus rhythm with a ventricular rate of 69 bpm.  Old inferior MI.  Normal QT and QTc intervals.  Left axis deviation.            Laboratory Data:  Lab Results   Component Value Date    BUN 17 01/23/2019    CREATININE 0.8 01/23/2019    BCR 22.0 (H) 01/23/2019    K 4.3 01/23/2019    CO2 25 01/23/2019    CALCIUM 9.3 01/23/2019    ALBUMIN 4.4 01/23/2019    LABIL2 1.7 01/23/2019    AST 28 01/23/2019    ALT 15 01/23/2019     Lab Results   Component Value Date    CALCIUM 9.3 01/23/2019     01/23/2019    K 4.3 01/23/2019    CO2 25 01/23/2019     01/23/2019    BUN 17 01/23/2019    CREATININE 0.8 01/23/2019    BCR 22.0 (H) 01/23/2019     No results found for: WBC, HGB, HCT, MCV, PLT  Lab Results   Component Value Date    CHLPL 166 01/23/2019    TRIG 256 (H) 01/23/2019    HDL 42 (L) 01/23/2019    LDL 73 01/23/2019     No results found for: HGBA1C  No results found for: INR, PROTIME    Most Recent Echo:       Most Recent Stress Test:       Most Recent Cardiac Catheterization:   No results found for this or any previous visit.       NOTE: The following portions of the patient's note were reviewed, confirmed and/or updated this visit as appropriate: History of present illness/Interval history, physical examination, assessment & plan, allergies, current medications, past family history, past medical history, past social history, past surgical history and problem list.

## 2022-06-30 RX ORDER — CLOPIDOGREL BISULFATE 75 MG/1
TABLET ORAL
Qty: 90 TABLET | Refills: 0 | Status: SHIPPED | OUTPATIENT
Start: 2022-06-30 | End: 2022-09-29

## 2022-06-30 RX ORDER — METOPROLOL SUCCINATE 25 MG/1
TABLET, EXTENDED RELEASE ORAL
Qty: 90 TABLET | Refills: 0 | Status: SHIPPED | OUTPATIENT
Start: 2022-06-30 | End: 2022-09-29

## 2022-06-30 RX ORDER — ROSUVASTATIN CALCIUM 10 MG/1
TABLET, COATED ORAL
Qty: 90 TABLET | Refills: 0 | Status: SHIPPED | OUTPATIENT
Start: 2022-06-30 | End: 2022-09-29

## 2022-09-29 RX ORDER — METOPROLOL SUCCINATE 25 MG/1
TABLET, EXTENDED RELEASE ORAL
Qty: 90 TABLET | Refills: 0 | Status: SHIPPED | OUTPATIENT
Start: 2022-09-29 | End: 2022-11-03

## 2022-09-29 RX ORDER — CLOPIDOGREL BISULFATE 75 MG/1
TABLET ORAL
Qty: 90 TABLET | Refills: 0 | Status: SHIPPED | OUTPATIENT
Start: 2022-09-29 | End: 2022-12-27

## 2022-09-29 RX ORDER — ROSUVASTATIN CALCIUM 10 MG/1
TABLET, COATED ORAL
Qty: 90 TABLET | Refills: 0 | Status: SHIPPED | OUTPATIENT
Start: 2022-09-29 | End: 2022-12-27

## 2022-10-27 ENCOUNTER — OFFICE VISIT (OUTPATIENT)
Dept: CARDIOLOGY | Facility: CLINIC | Age: 77
End: 2022-10-27

## 2022-10-27 VITALS
SYSTOLIC BLOOD PRESSURE: 120 MMHG | WEIGHT: 147 LBS | HEIGHT: 62 IN | HEART RATE: 86 BPM | BODY MASS INDEX: 27.05 KG/M2 | DIASTOLIC BLOOD PRESSURE: 70 MMHG | OXYGEN SATURATION: 97 %

## 2022-10-27 DIAGNOSIS — E78.00 HYPERCHOLESTEROLEMIA: ICD-10-CM

## 2022-10-27 DIAGNOSIS — I25.10 CORONARY ARTERY DISEASE INVOLVING NATIVE CORONARY ARTERY OF NATIVE HEART WITHOUT ANGINA PECTORIS: Primary | ICD-10-CM

## 2022-10-27 DIAGNOSIS — I10 PRIMARY HYPERTENSION: ICD-10-CM

## 2022-10-27 DIAGNOSIS — Z79.02 LONG TERM CURRENT USE OF ANTITHROMBOTICS/ANTIPLATELETS: ICD-10-CM

## 2022-10-27 DIAGNOSIS — I11.9 HYPERTENSIVE HEART DISEASE WITHOUT HEART FAILURE: ICD-10-CM

## 2022-10-27 PROCEDURE — 99214 OFFICE O/P EST MOD 30 MIN: CPT | Performed by: INTERNAL MEDICINE

## 2022-10-27 PROCEDURE — 93000 ELECTROCARDIOGRAM COMPLETE: CPT | Performed by: INTERNAL MEDICINE

## 2022-11-03 RX ORDER — METOPROLOL SUCCINATE 25 MG/1
TABLET, EXTENDED RELEASE ORAL
Qty: 90 TABLET | Refills: 0 | Status: SHIPPED | OUTPATIENT
Start: 2022-11-03

## 2022-12-27 RX ORDER — ROSUVASTATIN CALCIUM 10 MG/1
TABLET, COATED ORAL
Qty: 90 TABLET | Refills: 0 | Status: SHIPPED | OUTPATIENT
Start: 2022-12-27

## 2022-12-27 RX ORDER — CLOPIDOGREL BISULFATE 75 MG/1
TABLET ORAL
Qty: 90 TABLET | Refills: 0 | Status: SHIPPED | OUTPATIENT
Start: 2022-12-27

## 2023-04-10 RX ORDER — CLOPIDOGREL BISULFATE 75 MG/1
TABLET ORAL
Qty: 90 TABLET | Refills: 0 | Status: SHIPPED | OUTPATIENT
Start: 2023-04-10

## 2023-04-10 RX ORDER — ROSUVASTATIN CALCIUM 10 MG/1
TABLET, COATED ORAL
Qty: 90 TABLET | Refills: 0 | Status: SHIPPED | OUTPATIENT
Start: 2023-04-10

## 2023-04-10 RX ORDER — METOPROLOL SUCCINATE 25 MG/1
TABLET, EXTENDED RELEASE ORAL
Qty: 90 TABLET | Refills: 0 | Status: SHIPPED | OUTPATIENT
Start: 2023-04-10

## 2023-04-27 ENCOUNTER — OFFICE VISIT (OUTPATIENT)
Dept: CARDIOLOGY | Facility: CLINIC | Age: 78
End: 2023-04-27
Payer: MEDICARE

## 2023-04-27 VITALS
WEIGHT: 152 LBS | HEIGHT: 62 IN | SYSTOLIC BLOOD PRESSURE: 136 MMHG | OXYGEN SATURATION: 97 % | HEART RATE: 65 BPM | DIASTOLIC BLOOD PRESSURE: 75 MMHG | BODY MASS INDEX: 27.97 KG/M2

## 2023-04-27 DIAGNOSIS — E78.00 HYPERCHOLESTEROLEMIA: ICD-10-CM

## 2023-04-27 DIAGNOSIS — I10 PRIMARY HYPERTENSION: ICD-10-CM

## 2023-04-27 DIAGNOSIS — F17.200 TOBACCO DEPENDENCE SYNDROME: ICD-10-CM

## 2023-04-27 DIAGNOSIS — Z79.02 LONG TERM CURRENT USE OF ANTITHROMBOTICS/ANTIPLATELETS: ICD-10-CM

## 2023-04-27 DIAGNOSIS — I11.9 HYPERTENSIVE HEART DISEASE WITHOUT HEART FAILURE: ICD-10-CM

## 2023-04-27 DIAGNOSIS — I25.10 CORONARY ARTERY DISEASE INVOLVING NATIVE CORONARY ARTERY OF NATIVE HEART WITHOUT ANGINA PECTORIS: Primary | ICD-10-CM

## 2023-04-27 RX ORDER — METOPROLOL SUCCINATE 25 MG/1
25 TABLET, EXTENDED RELEASE ORAL DAILY
Qty: 90 TABLET | Refills: 3 | Status: SHIPPED | OUTPATIENT
Start: 2023-04-27

## 2023-04-27 RX ORDER — CLOPIDOGREL BISULFATE 75 MG/1
75 TABLET ORAL DAILY
Qty: 90 TABLET | Refills: 3 | Status: SHIPPED | OUTPATIENT
Start: 2023-04-27

## 2023-04-27 RX ORDER — ROSUVASTATIN CALCIUM 10 MG/1
10 TABLET, COATED ORAL DAILY
Qty: 90 TABLET | Refills: 3 | Status: SHIPPED | OUTPATIENT
Start: 2023-04-27

## 2023-04-27 NOTE — PROGRESS NOTES
Cardiology Office Visit      Encounter Date:  04/27/2023    Patient ID:   Didi Ceballos is a 77 y.o. female.    Reason For Followup:  Coronary artery disease  Hypertension  Hyperlipidemia  Hypertensive cardiovascular disease     Brief Clinical History:  Dear Patience Shaver APRN    I had the pleasure of seeing Didi Ceballos today. As you are well aware, this is a 77 y.o. female with a known history of coronary occlusive disease. She underwent emergent PTCA and stenting secondary to acute inferior wall myocardial infarction in the past. She has additional history that includes dyslipidemia, hypertension with hypertensive cardiovascular disease, tobacco abuse disorder, and antiplatelet therapy. She presents today for routine followup above conditions.     Interval History:  She denies any chest pain pressure heaviness or tightness she denies any shortness of breath. She denies any syncope or near syncope. She reports feeling quite well from a cardiac perspective.    She continues to smoke cigarettes.  She smokes about a half a pack a day.  We have discussed the ill effects of smoking.  She has no desire to stop currently.      Assessment & Plan     Impressions:  Coronary artery disease status post PTCA and drug-eluting stent placement in the right coronary artery with 2 XIENCE drug-eluting stents.  Residual coronary arterial disease involving the circumflex. There are 2-70% lesions noted in a branch with acute angulation.  Dyslipidemia  Antiplatelet therapy.  Hypertension with hypertensive cardiovascular disease. Suboptimally controlled today.  Tobacco abuse disorder  Bilateral hand numbness s/p carpel tunnel release     Recommendations:  Continuation of her current medical regimen     This includes antiplatelet, antihypertensives, and statin  Smoking cessation  Follow-up in 6 months time sooner should there be difficulties.    Diagnoses and all orders for this visit:    1. Coronary artery disease involving native  "coronary artery of native heart without angina pectoris (Primary)  -     ECG 12 Lead    2. Hypercholesterolemia  -     ECG 12 Lead    3. Primary hypertension  -     ECG 12 Lead    4. Hypertensive heart disease without heart failure  -     ECG 12 Lead    5. Long term current use of antithrombotics/antiplatelets  -     ECG 12 Lead    6. Tobacco dependence syndrome  -     ECG 12 Lead    Other orders  -     clopidogrel (PLAVIX) 75 MG tablet; Take 1 tablet by mouth Daily.  Dispense: 90 tablet; Refill: 3  -     metoprolol succinate XL (TOPROL-XL) 25 MG 24 hr tablet; Take 1 tablet by mouth Daily.  Dispense: 90 tablet; Refill: 3  -     rosuvastatin (CRESTOR) 10 MG tablet; Take 1 tablet by mouth Daily.  Dispense: 90 tablet; Refill: 3          Objective:    Vitals:  Vitals:    04/27/23 0917   BP: 136/75   Pulse: 65   SpO2: 97%   Weight: 68.9 kg (152 lb)   Height: 157.5 cm (62\")     Body mass index is 27.8 kg/m².      Physical Exam:    General: Alert, cooperative, no distress, appears stated age  Head:  Normocephalic, atraumatic, mucous membranes moist  Eyes:  Conjunctiva/corneas clear, EOM's intact     Neck:  Supple,  no bruit    Lungs: Clear to auscultation bilaterally, no wheezes rhonchi rales are noted  Chest wall: No tenderness  Heart::  Regular rate and rhythm, S1 and S2 normal, 1/6 holosystolic murmur.  No rub or gallop  Abdomen: Soft, non-tender, nondistended bowel sounds active  Extremities: No cyanosis, clubbing, or edema  Pulses: 2+ and symmetric all extremities  Skin:  No rashes or lesions  Neuro/psych: A&O x3. CN II through XII are grossly intact with appropriate affect      Allergies:  No Known Allergies    Medication Review:     Current Outpatient Medications:   •  aspirin 81 MG tablet, Take 1 tablet by mouth Daily., Disp: , Rfl:   •  clopidogrel (PLAVIX) 75 MG tablet, Take 1 tablet by mouth Daily., Disp: 90 tablet, Rfl: 3  •  metoprolol succinate XL (TOPROL-XL) 25 MG 24 hr tablet, Take 1 tablet by mouth Daily., " Disp: 90 tablet, Rfl: 3  •  rosuvastatin (CRESTOR) 10 MG tablet, Take 1 tablet by mouth Daily., Disp: 90 tablet, Rfl: 3    Family History:  Family History   Problem Relation Age of Onset   • No Known Problems Mother    • Heart attack Father    • Heart attack Sister    • Heart attack Brother        Past Medical History:  Past Medical History:   Diagnosis Date   • Antiplatelet or antithrombotic long-term use    • Coronary artery disease    • Dyslipidemia    • Hyperlipidemia    • Hypertension    • Hypertensive cardiovascular disease    • Kidney stones    • Myocardial infarction        Past Surgical History:  Past Surgical History:   Procedure Laterality Date   • BACK SURGERY     • CARDIAC CATHETERIZATION     • CARPAL TUNNEL RELEASE Left 12/2021   • CORONARY ANGIOPLASTY WITH STENT PLACEMENT      RCA- 2 Xience drug eluting stents   • KIDNEY STONE SURGERY      x 2       Social History:  Social History     Socioeconomic History   • Marital status:    Tobacco Use   • Smoking status: Every Day     Packs/day: 1.00     Years: 18.00     Pack years: 18.00     Types: Cigarettes   • Smokeless tobacco: Never   Vaping Use   • Vaping Use: Never used   Substance and Sexual Activity   • Alcohol use: No   • Drug use: No   • Sexual activity: Defer       Review of Systems:  The following systems were reviewed as they relate to the cardiovascular system: Constitutional, Eyes, ENT, Cardiovascular, Respiratory, Gastrointestinal, Integumentary, Neurological, Psychiatric, Hematologic, Endocrine, Musculoskeletal, and Genitourinary. The pertinent cardiovascular findings are reported above with all other cardiovascular points within those systems being negative.    Diagnostic Study Review:     Current Electrocardiogram:    ECG 12 Lead    Date/Time: 4/27/2023 7:04 PM  Performed by: Sushil Cantu DO  Authorized by: Sushil Cantu DO   Comparison: not compared with previous ECG   Previous ECG: no previous ECG  available  Comments: Normal sinus rhythm with a ventricular rate of 65 bpm.  Low voltage in the precordial leads.  Normal QT and QTc intervals.  Normal QRS axis.            Laboratory Data:  Lab Results   Component Value Date    BUN 17 01/23/2019    CREATININE 0.8 01/23/2019    BCR 22.0 (H) 01/23/2019    K 4.3 01/23/2019    CO2 25 01/23/2019    CALCIUM 9.3 01/23/2019    ALBUMIN 4.4 01/23/2019    LABIL2 1.7 01/23/2019    AST 28 01/23/2019    ALT 15 01/23/2019     Lab Results   Component Value Date    CALCIUM 9.3 01/23/2019     01/23/2019    K 4.3 01/23/2019    CO2 25 01/23/2019     01/23/2019    BUN 17 01/23/2019    CREATININE 0.8 01/23/2019    BCR 22.0 (H) 01/23/2019     No results found for: WBC, HGB, HCT, MCV, PLT  Lab Results   Component Value Date    CHLPL 166 01/23/2019    TRIG 256 (H) 01/23/2019    HDL 42 (L) 01/23/2019    LDL 73 01/23/2019     No results found for: HGBA1C  No results found for: INR, PROTIME    Most Recent Echo:       Most Recent Stress Test:       Most Recent Cardiac Catheterization:   No results found for this or any previous visit.       NOTE: The following portions of the patient's note were reviewed, confirmed and/or updated this visit as appropriate: History of present illness/Interval history, physical examination, assessment & plan, allergies, current medications, past family history, past medical history, past social history, past surgical history and problem list.

## 2023-04-27 NOTE — LETTER
May 3, 2023     FRANDY Medina  [address]  [place] [address]    Patient: Didi Ceballos   YOB: 1945   Date of Visit: 4/27/2023       Dear FRANDY Shaver:    Thank you for referring Didi Ceballos to me for evaluation. Below are the relevant portions of my assessment and plan of care.    If you have questions, please do not hesitate to call me. I look forward to following Didi along with you.         Sincerely,        Sushil Cantu DO        CC: No Recipients    Sushil Cantu DO  05/03/23 1905  Signed  Cardiology Office Visit      Encounter Date:  04/27/2023    Patient ID:   Didi Ceballos is a 77 y.o. female.    Reason For Followup:  Coronary artery disease  Hypertension  Hyperlipidemia  Hypertensive cardiovascular disease     Brief Clinical History:  Dear Patience Shaver APRN    I had the pleasure of seeing Didi Cbeallos today. As you are well aware, this is a 77 y.o. female with a known history of coronary occlusive disease. She underwent emergent PTCA and stenting secondary to acute inferior wall myocardial infarction in the past. She has additional history that includes dyslipidemia, hypertension with hypertensive cardiovascular disease, tobacco abuse disorder, and antiplatelet therapy. She presents today for routine followup above conditions.     Interval History:  She denies any chest pain pressure heaviness or tightness she denies any shortness of breath. She denies any syncope or near syncope. She reports feeling quite well from a cardiac perspective.    She continues to smoke cigarettes.  She smokes about a half a pack a day.  We have discussed the ill effects of smoking.  She has no desire to stop currently.      Assessment & Plan     Impressions:  Coronary artery disease status post PTCA and drug-eluting stent placement in the right coronary artery with 2 XIENCE drug-eluting stents.  Residual coronary arterial disease involving the circumflex.  "There are 2-70% lesions noted in a branch with acute angulation.  Dyslipidemia  Antiplatelet therapy.  Hypertension with hypertensive cardiovascular disease. Suboptimally controlled today.  Tobacco abuse disorder  Bilateral hand numbness s/p carpel tunnel release     Recommendations:  Continuation of her current medical regimen     This includes antiplatelet, antihypertensives, and statin  Smoking cessation  Follow-up in 6 months time sooner should there be difficulties.    Diagnoses and all orders for this visit:    1. Coronary artery disease involving native coronary artery of native heart without angina pectoris (Primary)  -     ECG 12 Lead    2. Hypercholesterolemia  -     ECG 12 Lead    3. Primary hypertension  -     ECG 12 Lead    4. Hypertensive heart disease without heart failure  -     ECG 12 Lead    5. Long term current use of antithrombotics/antiplatelets  -     ECG 12 Lead    6. Tobacco dependence syndrome  -     ECG 12 Lead    Other orders  -     clopidogrel (PLAVIX) 75 MG tablet; Take 1 tablet by mouth Daily.  Dispense: 90 tablet; Refill: 3  -     metoprolol succinate XL (TOPROL-XL) 25 MG 24 hr tablet; Take 1 tablet by mouth Daily.  Dispense: 90 tablet; Refill: 3  -     rosuvastatin (CRESTOR) 10 MG tablet; Take 1 tablet by mouth Daily.  Dispense: 90 tablet; Refill: 3          Objective:    Vitals:  Vitals:    04/27/23 0917   BP: 136/75   Pulse: 65   SpO2: 97%   Weight: 68.9 kg (152 lb)   Height: 157.5 cm (62\")     Body mass index is 27.8 kg/m².      Physical Exam:    General: Alert, cooperative, no distress, appears stated age  Head:  Normocephalic, atraumatic, mucous membranes moist  Eyes:  Conjunctiva/corneas clear, EOM's intact     Neck:  Supple,  no bruit    Lungs: Clear to auscultation bilaterally, no wheezes rhonchi rales are noted  Chest wall: No tenderness  Heart::  Regular rate and rhythm, S1 and S2 normal, 1/6 holosystolic murmur.  No rub or gallop  Abdomen: Soft, non-tender, nondistended " bowel sounds active  Extremities: No cyanosis, clubbing, or edema  Pulses: 2+ and symmetric all extremities  Skin:  No rashes or lesions  Neuro/psych: A&O x3. CN II through XII are grossly intact with appropriate affect      Allergies:  No Known Allergies    Medication Review:     Current Outpatient Medications:   •  aspirin 81 MG tablet, Take 1 tablet by mouth Daily., Disp: , Rfl:   •  clopidogrel (PLAVIX) 75 MG tablet, Take 1 tablet by mouth Daily., Disp: 90 tablet, Rfl: 3  •  metoprolol succinate XL (TOPROL-XL) 25 MG 24 hr tablet, Take 1 tablet by mouth Daily., Disp: 90 tablet, Rfl: 3  •  rosuvastatin (CRESTOR) 10 MG tablet, Take 1 tablet by mouth Daily., Disp: 90 tablet, Rfl: 3    Family History:  Family History   Problem Relation Age of Onset   • No Known Problems Mother    • Heart attack Father    • Heart attack Sister    • Heart attack Brother        Past Medical History:  Past Medical History:   Diagnosis Date   • Antiplatelet or antithrombotic long-term use    • Coronary artery disease    • Dyslipidemia    • Hyperlipidemia    • Hypertension    • Hypertensive cardiovascular disease    • Kidney stones    • Myocardial infarction        Past Surgical History:  Past Surgical History:   Procedure Laterality Date   • BACK SURGERY     • CARDIAC CATHETERIZATION     • CARPAL TUNNEL RELEASE Left 12/2021   • CORONARY ANGIOPLASTY WITH STENT PLACEMENT      RCA- 2 Xience drug eluting stents   • KIDNEY STONE SURGERY      x 2       Social History:  Social History     Socioeconomic History   • Marital status:    Tobacco Use   • Smoking status: Every Day     Packs/day: 1.00     Years: 18.00     Pack years: 18.00     Types: Cigarettes   • Smokeless tobacco: Never   Vaping Use   • Vaping Use: Never used   Substance and Sexual Activity   • Alcohol use: No   • Drug use: No   • Sexual activity: Defer       Review of Systems:  The following systems were reviewed as they relate to the cardiovascular system: Constitutional,  Eyes, ENT, Cardiovascular, Respiratory, Gastrointestinal, Integumentary, Neurological, Psychiatric, Hematologic, Endocrine, Musculoskeletal, and Genitourinary. The pertinent cardiovascular findings are reported above with all other cardiovascular points within those systems being negative.    Diagnostic Study Review:     Current Electrocardiogram:    ECG 12 Lead    Date/Time: 4/27/2023 7:04 PM  Performed by: Sushil Cantu DO  Authorized by: Sushil Cantu DO   Comparison: not compared with previous ECG   Previous ECG: no previous ECG available  Comments: Normal sinus rhythm with a ventricular rate of 65 bpm.  Low voltage in the precordial leads.  Normal QT and QTc intervals.  Normal QRS axis.            Laboratory Data:  Lab Results   Component Value Date    BUN 17 01/23/2019    CREATININE 0.8 01/23/2019    BCR 22.0 (H) 01/23/2019    K 4.3 01/23/2019    CO2 25 01/23/2019    CALCIUM 9.3 01/23/2019    ALBUMIN 4.4 01/23/2019    LABIL2 1.7 01/23/2019    AST 28 01/23/2019    ALT 15 01/23/2019     Lab Results   Component Value Date    CALCIUM 9.3 01/23/2019     01/23/2019    K 4.3 01/23/2019    CO2 25 01/23/2019     01/23/2019    BUN 17 01/23/2019    CREATININE 0.8 01/23/2019    BCR 22.0 (H) 01/23/2019     No results found for: WBC, HGB, HCT, MCV, PLT  Lab Results   Component Value Date    CHLPL 166 01/23/2019    TRIG 256 (H) 01/23/2019    HDL 42 (L) 01/23/2019    LDL 73 01/23/2019     No results found for: HGBA1C  No results found for: INR, PROTIME    Most Recent Echo:       Most Recent Stress Test:       Most Recent Cardiac Catheterization:   No results found for this or any previous visit.       NOTE: The following portions of the patient's note were reviewed, confirmed and/or updated this visit as appropriate: History of present illness/Interval history, physical examination, assessment & plan, allergies, current medications, past family history, past medical history, past  social history, past surgical history and problem list.

## 2023-11-09 ENCOUNTER — OFFICE VISIT (OUTPATIENT)
Dept: CARDIOLOGY | Facility: CLINIC | Age: 78
End: 2023-11-09
Payer: MEDICARE

## 2023-11-09 VITALS
SYSTOLIC BLOOD PRESSURE: 130 MMHG | DIASTOLIC BLOOD PRESSURE: 80 MMHG | HEART RATE: 66 BPM | OXYGEN SATURATION: 99 % | WEIGHT: 145 LBS | HEIGHT: 62 IN | BODY MASS INDEX: 26.68 KG/M2

## 2023-11-09 DIAGNOSIS — Z79.02 LONG TERM CURRENT USE OF ANTITHROMBOTICS/ANTIPLATELETS: ICD-10-CM

## 2023-11-09 DIAGNOSIS — E78.2 MIXED HYPERLIPIDEMIA: ICD-10-CM

## 2023-11-09 DIAGNOSIS — F17.200 TOBACCO DEPENDENCE SYNDROME: ICD-10-CM

## 2023-11-09 DIAGNOSIS — I11.9 HYPERTENSIVE HEART DISEASE WITHOUT HEART FAILURE: ICD-10-CM

## 2023-11-09 DIAGNOSIS — I25.10 CORONARY ARTERY DISEASE INVOLVING NATIVE CORONARY ARTERY OF NATIVE HEART WITHOUT ANGINA PECTORIS: Primary | ICD-10-CM

## 2023-11-09 NOTE — PROGRESS NOTES
Cardiology Office Visit      Encounter Date:  11/09/2023    Patient ID:   Didi Ceballos is a 78 y.o. female.    Reason For Followup:  Coronary artery disease  Hypertension  Hyperlipidemia  Hypertensive cardiovascular disease     Brief Clinical History:  Dear Patience Shaver APRN    I had the pleasure of seeing Didi Ceballos today. As you are well aware, this is a 78 y.o. female with a known history of coronary occlusive disease. She underwent emergent PTCA and stenting secondary to acute inferior wall myocardial infarction in the past. She has additional history that includes dyslipidemia, hypertension with hypertensive cardiovascular disease, tobacco abuse disorder, and antiplatelet therapy. She presents today for routine followup above conditions.     Interval History:  She denies any chest pain pressure heaviness or tightness she denies any shortness of breath. She denies any syncope or near syncope. She reports feeling quite well from a cardiac perspective.    She continues to smoke.  She smokes less than a pack a day.  She has no desire to stop despite the known ill effects of smoking.      Assessment & Plan     Impressions:  Coronary artery disease status post PTCA and drug-eluting stent placement in the right coronary artery with 2 XIENCE drug-eluting stents.  Residual coronary arterial disease involving the circumflex. There are 2-70% lesions noted in a branch with acute angulation.  Dyslipidemia  Antiplatelet therapy.  Hypertension with hypertensive cardiovascular disease. Suboptimally controlled today.  Tobacco abuse disorder  Bilateral hand numbness s/p carpel tunnel release     Recommendations:  Continuation of her current medical regimen     This includes antiplatelet, antihypertensives, and statin  Smoking cessation  Follow-up in 6 months time sooner should there be difficulties.    Diagnoses and all orders for this visit:    1. Coronary artery disease involving native coronary artery of native heart  "without angina pectoris (Primary)  -     CBC & Differential; Future  -     Comprehensive Metabolic Panel; Future  -     Lipid Panel; Future  -     ECG 12 Lead    2. Hypertensive heart disease without heart failure  -     CBC & Differential; Future  -     Comprehensive Metabolic Panel; Future  -     Lipid Panel; Future  -     ECG 12 Lead    3. Long term current use of antithrombotics/antiplatelets  -     CBC & Differential; Future  -     Comprehensive Metabolic Panel; Future  -     Lipid Panel; Future  -     ECG 12 Lead    4. Tobacco dependence syndrome  -     CBC & Differential; Future  -     Comprehensive Metabolic Panel; Future  -     Lipid Panel; Future  -     ECG 12 Lead    5. Mixed hyperlipidemia  -     CBC & Differential; Future  -     Comprehensive Metabolic Panel; Future  -     Lipid Panel; Future  -     ECG 12 Lead            Objective:    Vitals:  Vitals:    11/09/23 1024   BP: 130/80   BP Location: Left arm   Patient Position: Sitting   Pulse: 66   SpO2: 99%   Weight: 65.8 kg (145 lb)   Height: 157.5 cm (62\")     Body mass index is 26.52 kg/m².      Physical Exam:    General: Alert, cooperative, no distress, appears stated age  Head:  Normocephalic, atraumatic, mucous membranes moist  Eyes:  Conjunctiva/corneas clear, EOM's intact     Neck:  Supple,  no bruit    Lungs: Clear to auscultation bilaterally, no wheezes rhonchi rales are noted  Chest wall: No tenderness  Heart::  Regular rate and rhythm, S1 and S2 normal, 1/6 holosystolic murmur.  No rub or gallop  Abdomen: Soft, non-tender, nondistended bowel sounds active  Extremities: No cyanosis, clubbing, or edema  Pulses: 2+ and symmetric all extremities  Skin:  No rashes or lesions  Neuro/psych: A&O x3. CN II through XII are grossly intact with appropriate affect      Allergies:  No Known Allergies    Medication Review:     Current Outpatient Medications:     aspirin 81 MG tablet, Take 1 tablet by mouth Daily., Disp: , Rfl:     clopidogrel (PLAVIX) 75 MG " tablet, Take 1 tablet by mouth Daily., Disp: 90 tablet, Rfl: 3    metoprolol succinate XL (TOPROL-XL) 25 MG 24 hr tablet, Take 1 tablet by mouth Daily., Disp: 90 tablet, Rfl: 3    rosuvastatin (CRESTOR) 10 MG tablet, Take 1 tablet by mouth Daily., Disp: 90 tablet, Rfl: 3    Family History:  Family History   Problem Relation Age of Onset    No Known Problems Mother     Heart attack Father     Heart attack Sister     Heart attack Brother        Past Medical History:  Past Medical History:   Diagnosis Date    Antiplatelet or antithrombotic long-term use     Coronary artery disease     Dyslipidemia     Hyperlipidemia     Hypertension     Hypertensive cardiovascular disease     Kidney stones     Myocardial infarction        Past Surgical History:  Past Surgical History:   Procedure Laterality Date    BACK SURGERY      CARDIAC CATHETERIZATION      CARPAL TUNNEL RELEASE Left 12/2021    CORONARY ANGIOPLASTY WITH STENT PLACEMENT      RCA- 2 Xience drug eluting stents    KIDNEY STONE SURGERY      x 2       Social History:  Social History     Socioeconomic History    Marital status:    Tobacco Use    Smoking status: Every Day     Packs/day: 1.00     Years: 18.00     Additional pack years: 0.00     Total pack years: 18.00     Types: Cigarettes    Smokeless tobacco: Never   Vaping Use    Vaping Use: Never used   Substance and Sexual Activity    Alcohol use: No    Drug use: No    Sexual activity: Defer       Review of Systems:  The following systems were reviewed as they relate to the cardiovascular system: Constitutional, Eyes, ENT, Cardiovascular, Respiratory, Gastrointestinal, Integumentary, Neurological, Psychiatric, Hematologic, Endocrine, Musculoskeletal, and Genitourinary. The pertinent cardiovascular findings are reported above with all other cardiovascular points within those systems being negative.    Diagnostic Study Review:     Current Electrocardiogram:    ECG 12 Lead    Date/Time: 11/9/2023 5:11 PM  Performed  "by: Sushil Cantu DO    Authorized by: Sushil Cantu DO  Comparison: not compared with previous ECG   Previous ECG: no previous ECG available  Comments: Normal sinus rhythm with a ventricular rate of 77 bpm.  Ventricular premature complex.  Old inferior MI.  Normal QT and QTc intervals.          Laboratory Data:  Lab Results   Component Value Date    BUN 17 01/23/2019    CREATININE 0.8 01/23/2019    BCR 22.0 (H) 01/23/2019    K 4.3 01/23/2019    CO2 25 01/23/2019    CALCIUM 9.3 01/23/2019    ALBUMIN 4.4 01/23/2019    LABIL2 1.7 01/23/2019    AST 28 01/23/2019    ALT 15 01/23/2019     Lab Results   Component Value Date    CALCIUM 9.3 01/23/2019     01/23/2019    K 4.3 01/23/2019    CO2 25 01/23/2019     01/23/2019    BUN 17 01/23/2019    CREATININE 0.8 01/23/2019    BCR 22.0 (H) 01/23/2019     No results found for: \"WBC\", \"HGB\", \"HCT\", \"MCV\", \"PLT\"  Lab Results   Component Value Date    CHLPL 166 01/23/2019    TRIG 256 (H) 01/23/2019    HDL 42 (L) 01/23/2019    LDL 73 01/23/2019     No results found for: \"HGBA1C\"  No results found for: \"INR\", \"PROTIME\"    Most Recent Echo:       Most Recent Stress Test:       Most Recent Cardiac Catheterization:   No results found for this or any previous visit.       NOTE: The following portions of the patient's note were reviewed, confirmed and/or updated this visit as appropriate: History of present illness/Interval history, physical examination, assessment & plan, allergies, current medications, past family history, past medical history, past social history, past surgical history and problem list.  "

## 2024-04-25 RX ORDER — CLOPIDOGREL BISULFATE 75 MG/1
75 TABLET ORAL DAILY
Qty: 90 TABLET | Refills: 0 | Status: SHIPPED | OUTPATIENT
Start: 2024-04-25

## 2024-04-25 RX ORDER — ROSUVASTATIN CALCIUM 10 MG/1
10 TABLET, COATED ORAL DAILY
Qty: 90 TABLET | Refills: 0 | Status: SHIPPED | OUTPATIENT
Start: 2024-04-25

## 2024-04-25 RX ORDER — METOPROLOL SUCCINATE 25 MG/1
25 TABLET, EXTENDED RELEASE ORAL DAILY
Qty: 90 TABLET | Refills: 0 | Status: SHIPPED | OUTPATIENT
Start: 2024-04-25

## 2024-05-09 ENCOUNTER — OFFICE VISIT (OUTPATIENT)
Dept: CARDIOLOGY | Facility: CLINIC | Age: 79
End: 2024-05-09
Payer: MEDICARE

## 2024-05-09 VITALS
SYSTOLIC BLOOD PRESSURE: 120 MMHG | OXYGEN SATURATION: 98 % | BODY MASS INDEX: 25.76 KG/M2 | DIASTOLIC BLOOD PRESSURE: 86 MMHG | HEART RATE: 76 BPM | WEIGHT: 140 LBS | HEIGHT: 62 IN

## 2024-05-09 DIAGNOSIS — Z79.02 LONG TERM CURRENT USE OF ANTITHROMBOTICS/ANTIPLATELETS: ICD-10-CM

## 2024-05-09 DIAGNOSIS — R42 DIZZINESS AND GIDDINESS: ICD-10-CM

## 2024-05-09 DIAGNOSIS — I11.9 HYPERTENSIVE HEART DISEASE WITHOUT HEART FAILURE: ICD-10-CM

## 2024-05-09 DIAGNOSIS — I25.10 CORONARY ARTERY DISEASE INVOLVING NATIVE CORONARY ARTERY OF NATIVE HEART WITHOUT ANGINA PECTORIS: Primary | ICD-10-CM

## 2024-05-09 DIAGNOSIS — I10 PRIMARY HYPERTENSION: ICD-10-CM

## 2024-06-11 ENCOUNTER — HOSPITAL ENCOUNTER (OUTPATIENT)
Dept: CARDIOLOGY | Facility: HOSPITAL | Age: 79
Discharge: HOME | End: 2024-06-11
Admitting: INTERNAL MEDICINE

## 2024-06-11 DIAGNOSIS — I11.9 HYPERTENSIVE HEART DISEASE WITHOUT HEART FAILURE: ICD-10-CM

## 2024-06-11 DIAGNOSIS — I25.10 CORONARY ARTERY DISEASE INVOLVING NATIVE CORONARY ARTERY OF NATIVE HEART WITHOUT ANGINA PECTORIS: ICD-10-CM

## 2024-06-11 DIAGNOSIS — Z79.02 LONG TERM CURRENT USE OF ANTITHROMBOTICS/ANTIPLATELETS: ICD-10-CM

## 2024-06-11 DIAGNOSIS — R42 DIZZINESS AND GIDDINESS: ICD-10-CM

## 2024-06-11 DIAGNOSIS — I10 PRIMARY HYPERTENSION: ICD-10-CM

## 2024-06-11 LAB
BH CV VAS SCREENING CAROTID CCA LEFT: 51 CM/SEC
BH CV VAS SCREENING CAROTID CCA RIGHT: 67 CM/SEC
BH CV VAS SCREENING CAROTID ICA LEFT: 273 CM/SEC
BH CV VAS SCREENING CAROTID ICA RIGHT: 60 CM/SEC
BH CV XLRA MEAS - MID AO DIAM: 1.7 CM
BH CV XLRA MEAS - PAD LEFT ABI PT: 1.12
BH CV XLRA MEAS - PAD LEFT ARM: 143 MMHG
BH CV XLRA MEAS - PAD LEFT LEG PT: 160 MMHG
BH CV XLRA MEAS - PAD RIGHT ABI PT: 1.13
BH CV XLRA MEAS - PAD RIGHT ARM: 142 MMHG
BH CV XLRA MEAS - PAD RIGHT LEG PT: 161 MMHG
BH CV XLRA MEAS LEFT DIST CCA EDV: 13 CM/SEC
BH CV XLRA MEAS LEFT DIST CCA PSV: 50.9 CM/SEC
BH CV XLRA MEAS LEFT ICA/CCA RATIO: 5.4
BH CV XLRA MEAS LEFT PROX ICA EDV: -95.1 CM/SEC
BH CV XLRA MEAS LEFT PROX ICA PSV: -273 CM/SEC
BH CV XLRA MEAS RIGHT DIST CCA EDV: 21.1 CM/SEC
BH CV XLRA MEAS RIGHT DIST CCA PSV: 67.1 CM/SEC
BH CV XLRA MEAS RIGHT ICA/CCA RATIO: 0.9
BH CV XLRA MEAS RIGHT PROX ICA EDV: 17.4 CM/SEC
BH CV XLRA MEAS RIGHT PROX ICA PSV: 60.3 CM/SEC

## 2024-06-11 PROCEDURE — 93799 UNLISTED CV SVC/PROCEDURE: CPT

## 2024-07-22 RX ORDER — CLOPIDOGREL BISULFATE 75 MG/1
75 TABLET ORAL DAILY
Qty: 90 TABLET | Refills: 1 | Status: SHIPPED | OUTPATIENT
Start: 2024-07-22

## 2024-07-22 RX ORDER — METOPROLOL SUCCINATE 25 MG/1
25 TABLET, EXTENDED RELEASE ORAL DAILY
Qty: 90 TABLET | Refills: 1 | Status: SHIPPED | OUTPATIENT
Start: 2024-07-22

## 2024-07-22 RX ORDER — ROSUVASTATIN CALCIUM 10 MG/1
10 TABLET, COATED ORAL DAILY
Qty: 90 TABLET | Refills: 1 | Status: SHIPPED | OUTPATIENT
Start: 2024-07-22

## 2024-11-08 ENCOUNTER — OFFICE VISIT (OUTPATIENT)
Dept: CARDIOLOGY | Facility: CLINIC | Age: 79
End: 2024-11-08
Payer: MEDICARE

## 2024-11-08 VITALS
SYSTOLIC BLOOD PRESSURE: 124 MMHG | BODY MASS INDEX: 25.61 KG/M2 | DIASTOLIC BLOOD PRESSURE: 74 MMHG | HEIGHT: 62 IN | HEART RATE: 76 BPM | OXYGEN SATURATION: 96 %

## 2024-11-08 DIAGNOSIS — I65.22 CAROTID STENOSIS, ASYMPTOMATIC, LEFT: Primary | ICD-10-CM

## 2024-11-08 DIAGNOSIS — I25.10 CORONARY ARTERY DISEASE INVOLVING NATIVE CORONARY ARTERY OF NATIVE HEART WITHOUT ANGINA PECTORIS: ICD-10-CM

## 2024-11-08 DIAGNOSIS — I10 PRIMARY HYPERTENSION: ICD-10-CM

## 2024-11-08 DIAGNOSIS — I11.9 HYPERTENSIVE HEART DISEASE WITHOUT HEART FAILURE: ICD-10-CM

## 2024-11-08 DIAGNOSIS — Z79.02 LONG TERM CURRENT USE OF ANTITHROMBOTICS/ANTIPLATELETS: ICD-10-CM

## 2024-11-08 NOTE — PROGRESS NOTES
Cardiology Office Visit      Encounter Date:  11/08/2024    Patient ID:   Didi Ceballos is a 79 y.o. female.    Reason For Followup:  Coronary artery disease  Hypertension  Hyperlipidemia  Hypertensive cardiovascular disease     Brief Clinical History:  Dear Patience Shaver APRN    I had the pleasure of seeing Didi Ceballos today. As you are well aware, this is a 79 y.o. female with a known history of coronary occlusive disease. She underwent emergent PTCA and stenting secondary to acute inferior wall myocardial infarction in the past. She has additional history that includes dyslipidemia, hypertension with hypertensive cardiovascular disease, tobacco abuse disorder, and antiplatelet therapy. She presents today for routine followup above conditions.     Interval History:  She denies any chest pain pressure heaviness or tightness she denies any shortness of breath. She denies any syncope or near syncope. She reports feeling quite well from a cardiac perspective.    She continues to smoke.  She smokes less than a pack a day.  She has no desire to stop despite the known ill effects of smoking.    05/09/2024        She is feeling well from a cardiac perspective. No chest pain. No shortness of breath out of character. Has some back issues but otherwise feels ok.    She reports that she is concerned about vascular disease.  She is a smoker and does carry a risk of PAD and aortic aneurysm.  We discussed options and will get her a vascular screening bundle.    11/08/2024        She reports feeling well from a cardiac perspective. No chest pain. No shortness of breath out of character. No  other new issues.     She had a vascular screening exam done and she does have some carotid disease. We discussed options and we will place a referral to vascular surgery.     She is going to see a back specialist because of pain as well.       Assessment & Plan     Impressions:  Coronary artery disease status post PTCA and drug-eluting  "stent placement in the right coronary artery with 2 XIENCE drug-eluting stents.  Residual coronary arterial disease involving the circumflex. There are 2-70% lesions noted in a branch with acute angulation.  Dyslipidemia  Antiplatelet therapy.  Hypertension with hypertensive cardiovascular disease. Suboptimally controlled today.  Tobacco abuse disorder  Bilateral hand numbness s/p carpel tunnel release     Recommendations:  Continuation of her current medical regimen     This includes antiplatelet, antihypertensives, and statin  Smoking cessation  Vascular surgery referral for carotid disease  Follow-up in 6 months time sooner should there be difficulties.    Diagnoses and all orders for this visit:    1. Carotid stenosis, asymptomatic, left (Primary)  -     Ambulatory Referral to Vascular Surgery  -     CBC & Differential; Future  -     Comprehensive Metabolic Panel; Future  -     Lipid Panel; Future  -     ECG 12 Lead    2. Coronary artery disease involving native coronary artery of native heart without angina pectoris  -     CBC & Differential; Future  -     Comprehensive Metabolic Panel; Future  -     Lipid Panel; Future  -     ECG 12 Lead    3. Primary hypertension  -     CBC & Differential; Future  -     Comprehensive Metabolic Panel; Future  -     Lipid Panel; Future  -     ECG 12 Lead    4. Hypertensive heart disease without heart failure  -     CBC & Differential; Future  -     Comprehensive Metabolic Panel; Future  -     Lipid Panel; Future  -     ECG 12 Lead    5. Long term current use of antithrombotics/antiplatelets  -     CBC & Differential; Future  -     Comprehensive Metabolic Panel; Future  -     Lipid Panel; Future  -     ECG 12 Lead                Objective:    Vitals:  Vitals:    11/08/24 1122   BP: 124/74   Pulse: 76   SpO2: 96%   Height: 157.5 cm (62\")       Body mass index is 25.61 kg/m².      Physical Exam:    General: Alert, cooperative, no distress, appears stated age  Head:  Normocephalic, " atraumatic, mucous membranes moist  Eyes:  Conjunctiva/corneas clear, EOM's intact     Neck:  Supple,  no bruit    Lungs: Clear to auscultation bilaterally, no wheezes rhonchi rales are noted  Chest wall: No tenderness  Heart::  Regular rate and rhythm, S1 and S2 normal, 1/6 holosystolic murmur.  No rub or gallop  Abdomen: Soft, non-tender, nondistended bowel sounds active  Extremities: No cyanosis, clubbing, or edema  Pulses: 2+ and symmetric all extremities  Skin:  No rashes or lesions  Neuro/psych: A&O x3. CN II through XII are grossly intact with appropriate affect      Allergies:  No Known Allergies    Medication Review:     Current Outpatient Medications:     aspirin 81 MG tablet, Take 1 tablet by mouth Daily., Disp: , Rfl:     clopidogrel (PLAVIX) 75 MG tablet, Take 1 tablet by mouth once daily, Disp: 90 tablet, Rfl: 1    metoprolol succinate XL (TOPROL-XL) 25 MG 24 hr tablet, Take 1 tablet by mouth once daily, Disp: 90 tablet, Rfl: 1    rosuvastatin (CRESTOR) 10 MG tablet, Take 1 tablet by mouth once daily, Disp: 90 tablet, Rfl: 1    Family History:  Family History   Problem Relation Age of Onset    No Known Problems Mother     Heart attack Father     Heart attack Sister     Heart attack Brother        Past Medical History:  Past Medical History:   Diagnosis Date    Antiplatelet or antithrombotic long-term use     Coronary artery disease     Dyslipidemia     Hyperlipidemia     Hypertension     Hypertensive cardiovascular disease     Kidney stones     Myocardial infarction        Past Surgical History:  Past Surgical History:   Procedure Laterality Date    BACK SURGERY      CARDIAC CATHETERIZATION      CARPAL TUNNEL RELEASE Left 12/2021    CORONARY ANGIOPLASTY WITH STENT PLACEMENT      RCA- 2 Xience drug eluting stents    KIDNEY STONE SURGERY      x 2       Social History:  Social History     Socioeconomic History    Marital status:    Tobacco Use    Smoking status: Every Day     Current packs/day:  1.00     Average packs/day: 1 pack/day for 18.0 years (18.0 ttl pk-yrs)     Types: Cigarettes     Passive exposure: Current    Smokeless tobacco: Never   Vaping Use    Vaping status: Never Used   Substance and Sexual Activity    Alcohol use: No    Drug use: No    Sexual activity: Defer       Review of Systems:  The following systems were reviewed as they relate to the cardiovascular system: Constitutional, Eyes, ENT, Cardiovascular, Respiratory, Gastrointestinal, Integumentary, Neurological, Psychiatric, Hematologic, Endocrine, Musculoskeletal, and Genitourinary. The pertinent cardiovascular findings are reported above with all other cardiovascular points within those systems being negative.    Diagnostic Study Review:     Current Electrocardiogram:    ECG 12 Lead    Date/Time: 11/8/2024 2:07 PM  Performed by: Sushil Cantu DO    Authorized by: Sushil Cantu DO  Comparison: not compared with previous ECG   Previous ECG: no previous ECG available  Comments: Normal sinus rhythm with a ventricular rate of 83 bpm.  Premature ventricular complex.  PAC.  Normal QT and QTc intervals.  Normal QRS axis.          Laboratory Data:  Lab Results   Component Value Date    BUN 17 01/23/2019    CREATININE 0.8 01/23/2019    BCR 22.0 (H) 01/23/2019    K 4.3 01/23/2019    CO2 25 01/23/2019    CALCIUM 9.3 01/23/2019    ALBUMIN 4.4 01/23/2019    LABIL2 1.7 01/23/2019    AST 28 01/23/2019    ALT 15 01/23/2019     Lab Results   Component Value Date    CALCIUM 9.3 01/23/2019     01/23/2019    K 4.3 01/23/2019    CO2 25 01/23/2019     01/23/2019    BUN 17 01/23/2019    CREATININE 0.8 01/23/2019    BCR 22.0 (H) 01/23/2019     Lab Results   Component Value Date    WBC 6.4 01/23/2019    HGB 16.0 01/23/2019    HCT 46.8 (H) 01/23/2019    MCV 93.6 01/23/2019     01/23/2019     Lab Results   Component Value Date    CHLPL 166 01/23/2019    TRIG 256 (H) 01/23/2019    HDL 42 (L) 01/23/2019    LDL 73  "01/23/2019     No results found for: \"HGBA1C\"  No results found for: \"INR\", \"PROTIME\"    Most Recent Echo:       Most Recent Stress Test:       Most Recent Cardiac Catheterization:   No results found for this or any previous visit.       NOTE:     Today,11/08/2024 ,the following portions of the patient's note were reviewed, confirmed and/or updated as appropriate: History of present illness/Interval history, physical examination, assessment & plan, allergies, current medications, past family history, past medical history, past social history, past surgical history and problem list.    Labs pertinent to today's visit on 11/08/2024 (including but not limited to CBC, CMP, and lipid profiles) were requested from the patient's primary care provider/hospital/clinical laboratory.  If the labs were available for the visit, they were reviewed with the patient.  If they were not available, when received, special interest will be made to the labs pertinent to this visit.  The patient's most recent \"in-house\" labs are noted below and have been reviewed.  Outside labs pertinent to this visit are scanned into the record and have been reviewed.    Discussions held today, 11/08/2024,regarding procedures included risk, benefits, and options including but not limited to: Death, MI, stroke, pain, bleeding, infection, and possible need for vascular/thoracic/cardiothoracic surgery.    Copied information within this note was reviewed and is current as of 11/08/2024.    Assessment and plan noted herein represents the current plan of care as of 11/08/2024.    Significant resources from our office and staff are inherent in engaging this patient today,11/08/2024,and in a continuous and active collaborative plan of care related to their chronic cardiovascular conditions outlined herein.  The management of these conditions requires the direction of our service with specialized clinical knowledge, skills, and experience.  This collaborative " care includes but is not limited to patient education, expectations and responsibilities, shared decision making around therapeutic goals, and shared commitments to achieve those goals.

## 2024-12-02 ENCOUNTER — OFFICE VISIT (OUTPATIENT)
Age: 79
End: 2024-12-02
Payer: MEDICARE

## 2024-12-02 VITALS
SYSTOLIC BLOOD PRESSURE: 153 MMHG | BODY MASS INDEX: 26.31 KG/M2 | DIASTOLIC BLOOD PRESSURE: 88 MMHG | HEIGHT: 62 IN | WEIGHT: 143 LBS

## 2024-12-02 DIAGNOSIS — F17.210 CIGARETTE SMOKER: ICD-10-CM

## 2024-12-02 DIAGNOSIS — I65.23 CAROTID STENOSIS, BILATERAL: Primary | ICD-10-CM

## 2024-12-02 PROCEDURE — 3079F DIAST BP 80-89 MM HG: CPT | Performed by: SURGERY

## 2024-12-02 PROCEDURE — 99204 OFFICE O/P NEW MOD 45 MIN: CPT | Performed by: SURGERY

## 2024-12-02 PROCEDURE — 1159F MED LIST DOCD IN RCRD: CPT | Performed by: SURGERY

## 2024-12-02 PROCEDURE — 3075F SYST BP GE 130 - 139MM HG: CPT | Performed by: SURGERY

## 2024-12-02 PROCEDURE — 1160F RVW MEDS BY RX/DR IN RCRD: CPT | Performed by: SURGERY

## 2024-12-02 NOTE — PATIENT INSTRUCTIONS
Nutrition and Heart Health  In this video, you'll learn why nutrition is an important part of a healthy lifestyle, especially if you have heart disease.  To view the content, go to this web address:  [email or web address]    This video will  on: 2026. If you need access to this video following this date, please reach out to the healthcare provider who assigned it to you.  This information is not intended to replace advice given to you by your health care provider. Make sure you discuss any questions you have with your health care provider.  [employer] Patient Education ©  [employer] Smoking Cessation  You will learn methods to help you quit smoking and how quitting can help prevent a variety of health problems and improve your health.  To view the content, go to this web address:  [email or web address]    This video will  on: 2026. If you need access to this video following this date, please reach out to the healthcare provider who assigned it to you.  This information is not intended to replace advice given to you by your health care provider. Make sure you discuss any questions you have with your health care provider.  [employer] Patient Education ©  [employer]        Steps to Quit Smoking  Smoking tobacco is the leading cause of preventable death. It can affect almost every organ in the body. Smoking puts you and people around you at risk for many serious, long-lasting (chronic) diseases. Quitting smoking can be hard, but it is one of the best things that you can do for your health. It is never too late to quit.  Do not give up if you cannot quit the first time. Some people need to try many times to quit. Do your best to stick to your quit plan, and talk with your doctor if you have any questions or concerns.  How do I get ready to quit?  Pick a date to quit. Set a date within the next 2 weeks to give you time to prepare.  Write down the reasons why you are  quitting. Keep this list in places where you will see it often.  Tell your family, friends, and co-workers that you are quitting. Their support is important.  Talk with your doctor about the choices that may help you quit.  Find out if your health insurance will pay for these treatments.  Know the people, places, things, and activities that make you want to smoke (triggers). Avoid them.  What first steps can I take to quit smoking?  Throw away all cigarettes at home, at work, and in your car.  Throw away the things that you use when you smoke, such as ashtrays and lighters.  Clean your car. Empty the ashtray.  Clean your home, including curtains and carpets.  What can I do to help me quit smoking?  Talk with your doctor about taking medicines and seeing a counselor. You are more likely to succeed when you do both.  If you are pregnant or breastfeeding:  Talk with your doctor about counseling or other ways to quit smoking.  Do not take medicine to help you quit smoking unless your doctor tells you to.  Quit right away  Quit smoking completely, instead of slowly cutting back on how much you smoke over a period of time. Stopping smoking right away may be more successful than slowly quitting.  Go to counseling. In-person is best if this is an option. You are more likely to quit if you go to counseling sessions regularly.  Take medicine  You may take medicines to help you quit. Some medicines need a prescription, and some you can buy over-the-counter. Some medicines may contain a drug called nicotine to replace the nicotine in cigarettes. Medicines may:  Help you stop having the desire to smoke (cravings).  Help to stop the problems that come when you stop smoking (withdrawal symptoms).  Your doctor may ask you to use:  Nicotine patches, gum, or lozenges.  Nicotine inhalers or sprays.  Non-nicotine medicine that you take by mouth.  Find resources  Find resources and other ways to help you quit smoking and remain  smoke-free after you quit. They include:  Online chats with a counselor.  Phone quitlines.  Printed self-help materials.  Support groups or group counseling.  Text messaging programs.  Mobile phone apps. Use apps on your mobile phone or tablet that can help you stick to your quit plan. Examples of free services include Quit Guide from the CDC and smokefree.gov    What can I do to make it easier to quit?    Talk to your family and friends. Ask them to support and encourage you.  Call a phone quitline, such as 1[employer]M [employer]800-QUIT-NOW, reach out to support groups, or work with a counselor.  Ask people who smoke to not smoke around you.  Avoid places that make you want to smoke, such as:  Bars.  Parties.  Smoke-break areas at work.  Spend time with people who do not smoke.  Lower the stress in your life. Stress can make you want to smoke. Try these things to lower stress:  Getting regular exercise.  Doing deep-breathing exercises.  Doing yoga.  Meditating.  What benefits will I see if I quit smoking?  Over time, you may have:  A better sense of smell and taste.  Less coughing and sore throat.  A slower heart rate.  Lower blood pressure.  Clearer skin.  Better breathing.  Fewer sick days.  Summary  Quitting smoking can be hard, but it is one of the best things that you can do for your health.  Do not give up if you cannot quit the first time. Some people need to try many times to quit.  When you decide to quit smoking, make a plan to help you succeed.  Quit smoking right away, not slowly over a period of time.  When you start quitting, get help and support to keep you smoke-free.    This information is not intended to replace advice given to you by your health care provider. Make sure you discuss any questions you have with your health care provider.  Document Revised: 12/09/2022 Document Reviewed: 12/09/2022  [employer] Patient Education © 2024 [employer]. triple screening vascular study:  6/11/2024:

## 2024-12-02 NOTE — PROGRESS NOTES
"Chief Complaint  Carotid Artery Disease    Subjective        Didi Ceballos presents to [facility] VASCULAR SURGERY  History of Present Illness    New patient evaluation for asymptomatic moderate to severe left carotid disease seen on triple vascular screening.    Objective   Vital Signs:  /88 (BP Location: Right arm)   Ht 157.5 cm (62.01\")   Wt 64.9 kg (143 lb)   BMI 26.15 kg/m²   Estimated body mass index is 26.15 kg/m² as calculated from the following:    Height as of this encounter: 157.5 cm (62.01\").    Weight as of this encounter: 64.9 kg (143 lb).     BMI is >= 25 and <30. (Overweight) The following options were offered after discussion;: weight loss educational material (shared in after visit summary)    Didi Ceballos  reports that she has been smoking cigarettes. She has a 18 pack-year smoking history. She has been exposed to tobacco smoke. She has never used smokeless tobacco. I have educated her on the risk of diseases from using tobacco products such as arterial disease.     I advised her to quit and she is not willing to quit.    I spent 3  minutes counseling the patient.         Physical Exam  Constitutional:       Appearance: She is well-developed.   Pulmonary:      Effort: Pulmonary effort is normal. No respiratory distress.   Abdominal:      General: There is no distension.      Palpations: Abdomen is soft.   Neurological:      Mental Status: She is alert and oriented to person, place, and time.        Result Review :    The following data was reviewed by: Dhruv Luna MD on 12/02/24            Data reviewed : Cardiology studies as below  Vascular Surgical History:    Vascular Imaging History:  Triple screening study:  6/11/2024:  Left side 273/94 with ratio 5.4    (Text in bold font has been individually reviewed by myself and confirmed)         Assessment and Plan     Vascular surgery following for:  Carotid stenosis    Diagnoses and all orders for this visit:    1. " Carotid stenosis, bilateral (Primary)  -     CT Angiogram Neck; Future  -     CT Angiogram Head; Future    2. Cigarette smoker    79-year-old lady new patient evaluation for asymptomatic moderate to severe left carotid disease.  Based upon her velocities and ratios she is approaching if not over 70% threshold.  Will check CT angiogram of the head and neck to fully define anatomy and degree of stenosis.  If greater than 70% stenosis we will recommend endarterectomy.  She has had multiple PCI's in the past she states her last 1 was in 2012.  She is currently smoking.  We discussed the need for tobacco cessation      Vascular medical management: Patient should continue aspirin 81 mg daily, Plavix 75 mg daily, and Crestor 10 mg daily for  Return in about 1 month (around 1/2/2025), or if symptoms worsen or fail to improve, for after CTA.  Patient was given instructions and counseling regarding her condition or for health maintenance advice. Please see specific information pulled into the AVS if appropriate.

## 2024-12-23 ENCOUNTER — HOSPITAL ENCOUNTER (OUTPATIENT)
Dept: CT IMAGING | Facility: HOSPITAL | Age: 79
Discharge: HOME | End: 2024-12-23
Admitting: SURGERY
Payer: MEDICARE

## 2024-12-23 DIAGNOSIS — I65.23 CAROTID STENOSIS, BILATERAL: ICD-10-CM

## 2024-12-23 PROCEDURE — 70498 CT ANGIOGRAPHY NECK: CPT

## 2024-12-23 PROCEDURE — 25510000001 IOPAMIDOL PER 1 ML: Performed by: SURGERY

## 2024-12-23 PROCEDURE — 70496 CT ANGIOGRAPHY HEAD: CPT

## 2024-12-23 RX ORDER — IOPAMIDOL 755 MG/ML
100 INJECTION, SOLUTION INTRAVASCULAR
Status: COMPLETED | OUTPATIENT
Start: 2024-12-23 | End: 2024-12-23

## 2024-12-23 RX ADMIN — IOPAMIDOL 100 ML: 755 INJECTION, SOLUTION INTRAVENOUS at 15:29

## 2025-01-15 RX ORDER — CLOPIDOGREL BISULFATE 75 MG/1
75 TABLET ORAL DAILY
Qty: 90 TABLET | Refills: 2 | Status: SHIPPED | OUTPATIENT
Start: 2025-01-15

## 2025-01-15 RX ORDER — METOPROLOL SUCCINATE 25 MG/1
25 TABLET, EXTENDED RELEASE ORAL DAILY
Qty: 90 TABLET | Refills: 2 | Status: SHIPPED | OUTPATIENT
Start: 2025-01-15

## 2025-01-15 RX ORDER — ROSUVASTATIN CALCIUM 10 MG/1
10 TABLET, COATED ORAL DAILY
Qty: 90 TABLET | Refills: 2 | Status: SHIPPED | OUTPATIENT
Start: 2025-01-15

## 2025-01-20 ENCOUNTER — OFFICE VISIT (OUTPATIENT)
Age: 80
End: 2025-01-20
Payer: MEDICARE

## 2025-01-20 VITALS
HEIGHT: 62 IN | WEIGHT: 143 LBS | SYSTOLIC BLOOD PRESSURE: 146 MMHG | BODY MASS INDEX: 26.31 KG/M2 | DIASTOLIC BLOOD PRESSURE: 76 MMHG

## 2025-01-20 DIAGNOSIS — I65.23 CAROTID STENOSIS, BILATERAL: Primary | ICD-10-CM

## 2025-01-20 DIAGNOSIS — F17.210 CIGARETTE SMOKER: ICD-10-CM

## 2025-01-20 PROBLEM — I77.9 CAROTID ARTERY DISEASE: Status: ACTIVE | Noted: 2025-01-20

## 2025-01-20 PROCEDURE — 1159F MED LIST DOCD IN RCRD: CPT | Performed by: SURGERY

## 2025-01-20 PROCEDURE — 3077F SYST BP >= 140 MM HG: CPT | Performed by: SURGERY

## 2025-01-20 PROCEDURE — 1160F RVW MEDS BY RX/DR IN RCRD: CPT | Performed by: SURGERY

## 2025-01-20 PROCEDURE — 3078F DIAST BP <80 MM HG: CPT | Performed by: SURGERY

## 2025-01-20 PROCEDURE — 99215 OFFICE O/P EST HI 40 MIN: CPT | Performed by: SURGERY

## 2025-01-20 RX ORDER — SODIUM CHLORIDE 9 MG/ML
40 INJECTION, SOLUTION INTRAVENOUS AS NEEDED
OUTPATIENT
Start: 2025-01-20

## 2025-01-20 RX ORDER — ACETAMINOPHEN 325 MG/1
975 TABLET ORAL ONCE
OUTPATIENT
Start: 2025-01-20 | End: 2025-01-20

## 2025-01-20 RX ORDER — SODIUM CHLORIDE 0.9 % (FLUSH) 0.9 %
10 SYRINGE (ML) INJECTION EVERY 12 HOURS SCHEDULED
OUTPATIENT
Start: 2025-01-20

## 2025-01-20 RX ORDER — SODIUM CHLORIDE 0.9 % (FLUSH) 0.9 %
10 SYRINGE (ML) INJECTION AS NEEDED
OUTPATIENT
Start: 2025-01-20

## 2025-01-20 NOTE — PATIENT INSTRUCTIONS
Nutrition and Heart Health  In this video, you'll learn why nutrition is an important part of a healthy lifestyle, especially if you have heart disease.  To view the content, go to this web address:  [email or web address]    This video will  on: 2026. If you need access to this video following this date, please reach out to the healthcare provider who assigned it to you.  This information is not intended to replace advice given to you by your health care provider. Make sure you discuss any questions you have with your health care provider.  [employer] Patient Education ©  [employer]. Smoking Cessation  You will learn methods to help you quit smoking and how quitting can help prevent a variety of health problems and improve your health.  To view the content, go to this web address:  [email or web address]    This video will  on: 2026. If you need access to this video following this date, please reach out to the healthcare provider who assigned it to you.  This information is not intended to replace advice given to you by your health care provider. Make sure you discuss any questions you have with your health care provider.  [employer] Patient Education ©  [employer]        Steps to Quit Smoking  Smoking tobacco is the leading cause of preventable death. It can affect almost every organ in the body. Smoking puts you and people around you at risk for many serious, long-lasting (chronic) diseases. Quitting smoking can be hard, but it is one of the best things that you can do for your health. It is never too late to quit.  Do not give up if you cannot quit the first time. Some people need to try many times to quit. Do your best to stick to your quit plan, and talk with your doctor if you have any questions or concerns.  How do I get ready to quit?  Pick a date to quit. Set a date within the next 2 weeks to give you time to prepare.  Write down the reasons why you are  quitting. Keep this list in places where you will see it often.  Tell your family, friends, and co-workers that you are quitting. Their support is important.  Talk with your doctor about the choices that may help you quit.  Find out if your health insurance will pay for these treatments.  Know the people, places, things, and activities that make you want to smoke (triggers). Avoid them.  What first steps can I take to quit smoking?  Throw away all cigarettes at home, at work, and in your car.  Throw away the things that you use when you smoke, such as ashtrays and lighters.  Clean your car. Empty the ashtray.  Clean your home, including curtains and carpets.  What can I do to help me quit smoking?  Talk with your doctor about taking medicines and seeing a counselor. You are more likely to succeed when you do both.  If you are pregnant or breastfeeding:  Talk with your doctor about counseling or other ways to quit smoking.  Do not take medicine to help you quit smoking unless your doctor tells you to.  Quit right away  Quit smoking completely, instead of slowly cutting back on how much you smoke over a period of time. Stopping smoking right away may be more successful than slowly quitting.  Go to counseling. In-person is best if this is an option. You are more likely to quit if you go to counseling sessions regularly.  Take medicine  You may take medicines to help you quit. Some medicines need a prescription, and some you can buy over-the-counter. Some medicines may contain a drug called nicotine to replace the nicotine in cigarettes. Medicines may:  Help you stop having the desire to smoke (cravings).  Help to stop the problems that come when you stop smoking (withdrawal symptoms).  Your doctor may ask you to use:  Nicotine patches, gum, or lozenges.  Nicotine inhalers or sprays.  Non-nicotine medicine that you take by mouth.  Find resources  Find resources and other ways to help you quit smoking and remain  smoke-free after you quit. They include:  Online chats with a counselor.  Phone quitlines.  Printed self-help materials.  Support groups or group counseling.  Text messaging programs.  Mobile phone apps. Use apps on your mobile phone or tablet that can help you stick to your quit plan. Examples of free services include Quit Guide from the CDC and smokefree.gov    What can I do to make it easier to quit?    Talk to your family and friends. Ask them to support and encourage you.  Call a phone quitline, such as 1-800-QUIT-NOW, reach out to support groups, or work with a counselor.  Ask people who smoke to not smoke around you.  Avoid places that make you want to smoke, such as:  Bars.  Parties.  Smoke-break areas at work.  Spend time with people who do not smoke.  Lower the stress in your life. Stress can make you want to smoke. Try these things to lower stress:  Getting regular exercise.  Doing deep-breathing exercises.  Doing yoga.  Meditating.  What benefits will I see if I quit smoking?  Over time, you may have:  A better sense of smell and taste.  Less coughing and sore throat.  A slower heart rate.  Lower blood pressure.  Clearer skin.  Better breathing.  Fewer sick days.  Summary  Quitting smoking can be hard, but it is one of the best things that you can do for your health.  Do not give up if you cannot quit the first time. Some people need to try many times to quit.  When you decide to quit smoking, make a plan to help you succeed.  Quit smoking right away, not slowly over a period of time.  When you start quitting, get help and support to keep you smoke-free.    This information is not intended to replace advice given to you by your health care provider. Make sure you discuss any questions you have with your health care provider.  Document Revised: 12/09/2022 Document Reviewed: 12/09/2022  Elsevier Patient Education © 2024 [employer]vier Inc.

## 2025-01-20 NOTE — PROGRESS NOTES
"Chief Complaint  Carotid Artery Disease    Subjective        Didi Ceballos presents to [facility] VASCULAR SURGERY  Carotid Artery Disease      Follow-up after CT scan head and neck.  Denies any strokes mini stroke tremor versus fugax    Objective   Vital Signs:  /76 (BP Location: Right arm)   Ht 157.5 cm (62.01\")   Wt 64.9 kg (143 lb)   BMI 26.15 kg/m²   Estimated body mass index is 26.15 kg/m² as calculated from the following:    Height as of this encounter: 157.5 cm (62.01\").    Weight as of this encounter: 64.9 kg (143 lb).     BMI is >= 25 and <30. (Overweight) The following options were offered after discussion;: weight loss educational material (shared in after visit summary)    Didi Ceballos  reports that she has been smoking cigarettes. She has a 18 pack-year smoking history. She has been exposed to tobacco smoke. She has never used smokeless tobacco. I have educated her on the risk of diseases from using tobacco products such as arterial disease.     I advised her to quit and she is not willing to quit.    I spent 3  minutes counseling the patient.         Physical Exam  Constitutional:       Appearance: She is well-developed.   Pulmonary:      Effort: Pulmonary effort is normal. No respiratory distress.   Abdominal:      General: There is no distension.      Palpations: Abdomen is soft.   Neurological:      Mental Status: She is alert and oriented to person, place, and time.        Result Review :    The following data was reviewed by: Dhruv Luna MD on 01/20/25            Data reviewed : Cardiology studies as below  Vascular Surgical History:    Vascular Imaging History:  Triple screening study:  6/11/2024:  Left side 273/94 with ratio 5.4    CT angiogram of the head and neck:  12/23/2024:  Focal high-grade narrowing over a short segment near the left ICA origin, approximately 80% stenosis. No additional high-grade narrowing, large vessel occlusion or aneurysm in the head " and neck.       (Text in bold font has been individually reviewed by myself and confirmed)         Assessment and Plan     Vascular surgery following for:  Carotid stenosis    Diagnoses and all orders for this visit:    1. Carotid stenosis, bilateral (Primary)  -     Case Request; Standing  -     CBC (No Diff); Future  -     Basic Metabolic Panel; Future  -     XR Chest 1 View; Future  -     ECG 12 Lead; Future  -     Type & Screen; Future  -     sodium chloride 0.9 % flush 10 mL  -     sodium chloride 0.9 % flush 10 mL  -     sodium chloride 0.9 % infusion 40 mL  -     ceFAZolin (ANCEF) 2,000 mg in sodium chloride 0.9 % 100 mL IVPB  -     acetaminophen (TYLENOL) tablet 975 mg  -     Case Request  -     Ambulatory Referral to Cardiology    2. Cigarette smoker    Other orders  -     Inpatient Admission; Standing  -     Follow Anesthesia Guidelines / Protocol; Future  -     Follow Anesthesia Guidelines / Protocol; Standing  -     Chlorhexidine Skin Prep; Standing  -     Place Sequential Compression Device; Standing  -     Chlorhexidine Skin Prep; Standing  -     Place Sequential Compression Device; Standing  -     Provide NPO Instructions to Patient; Future  -     Chlorhexidine Skin Prep; Future  -     Provide Patient With Instructions on NPO Status; Future  -     Provide Chlorhexidine Skin Prep Wipes and Instructions; Future  -     Verify NPO Status; Future  -     Insert Peripheral IV x2; Standing  -     Saline Lock & Maintain IV Access; Standing      Pleasant 79-year-old lady severe left carotid artery focal stenosis.  Increased risk of stroke.  Recommended left transcarotid arterial stent placement.  Risk benefits discussed.  Will reach out to the patient's cardiologist Dr. Brad Arnett to ensure she is cleared for surgery.  All questions answered.  Patient should continue her aspirin and Plavix uninterrupted.    Patient has been recommended for carotid surgery.  This is a major surgery.  Patient understands the  inherent risks associated with carotid surgery.  These include but not are not limited to stroke, heart attack, bleeding, infection, need for secondary surgery/intervention, cranial nerve injury, and even death.  Patient also understands the nature of carotid disease and if a left on surgically treated would put them at increased risk for strokes in the future.      She is currently smoking.  We discussed the need for tobacco cessation      Vascular medical management: Patient should continue aspirin 81 mg daily, Plavix 75 mg daily, and Crestor 10 mg daily for  No follow-ups on file.  Patient was given instructions and counseling regarding her condition or for health maintenance advice. Please see specific information pulled into the AVS if appropriate.

## 2025-01-27 ENCOUNTER — OFFICE VISIT (OUTPATIENT)
Dept: CARDIOLOGY | Facility: CLINIC | Age: 80
End: 2025-01-27
Payer: MEDICARE

## 2025-01-27 VITALS
SYSTOLIC BLOOD PRESSURE: 143 MMHG | HEART RATE: 80 BPM | DIASTOLIC BLOOD PRESSURE: 84 MMHG | HEIGHT: 62 IN | WEIGHT: 142 LBS | BODY MASS INDEX: 26.13 KG/M2

## 2025-01-27 DIAGNOSIS — I65.22 STENOSIS OF LEFT CAROTID ARTERY: ICD-10-CM

## 2025-01-27 DIAGNOSIS — I25.2 HISTORY OF MI (MYOCARDIAL INFARCTION): ICD-10-CM

## 2025-01-27 DIAGNOSIS — I25.10 CORONARY ARTERY DISEASE INVOLVING NATIVE CORONARY ARTERY OF NATIVE HEART WITHOUT ANGINA PECTORIS: ICD-10-CM

## 2025-01-27 DIAGNOSIS — Z01.810 PREOPERATIVE CARDIOVASCULAR EXAMINATION: Primary | ICD-10-CM

## 2025-01-27 PROCEDURE — 1160F RVW MEDS BY RX/DR IN RCRD: CPT | Performed by: NURSE PRACTITIONER

## 2025-01-27 PROCEDURE — 3077F SYST BP >= 140 MM HG: CPT | Performed by: NURSE PRACTITIONER

## 2025-01-27 PROCEDURE — 1159F MED LIST DOCD IN RCRD: CPT | Performed by: NURSE PRACTITIONER

## 2025-01-27 PROCEDURE — 3079F DIAST BP 80-89 MM HG: CPT | Performed by: NURSE PRACTITIONER

## 2025-01-27 PROCEDURE — 99214 OFFICE O/P EST MOD 30 MIN: CPT | Performed by: NURSE PRACTITIONER

## 2025-01-27 NOTE — PROGRESS NOTES
[facility] CARDIOLOGY      REASON FOR FOLLOW-UP:  Preoperative cardiac risk assessment  Coronary artery disease  Hypertension  Hyperlipidemia  Hypertensive cardiovascular disease           Chief Complaint   Patient presents with    Heart Problem         Dear Patience Sanchez APRN        History of Present Illness   It was my pleasure to see Didi Ceballos in the office today.  She is a 79 y.o. female with a known history of coronary occlusive disease. She underwent emergent PTCA and stenting secondary to acute inferior wall myocardial infarction in 2012 at [facility]. She has additional history that includes dyslipidemia, hypertension with hypertensive cardiovascular disease, tobacco abuse disorder, antiplatelet therapy.    At last office visit she was referred to vascular surgeon for further evaluation of carotid artery disease.  She was seen in follow-up 1/20/2025 after CT head/neck was completed:    Focal high-grade narrowing over a short segment near the left ICA origin, approximately 80% stenosis. No additional high-grade narrowing, large vessel occlusion or aneurysm in the head and neck.  She was seen in follow-up by Dr. Luna on 1/20/2025 and was recommended for carotid artery endarterectomy.  She presents today for cardiac risk assessment to undergo this operation.  She reports to me today that she has had no symptoms of chest pain, pressure, tightness or palpitations.  She denies any shortness of breath at rest, dyspnea with exertion, lower extremity edema, orthopnea or PND.  She denies any dizziness or lightheadedness.      ASSESSMENT:  Preoperative cardiovascular risk assessment  Coronary artery disease status post PTCA and drug-eluting stent placement in the right coronary artery with 2 XIENCE drug-eluting stents.  Residual coronary arterial disease involving the circumflex. There are 2-70% lesions noted in a branch with acute angulation.  Dyslipidemia  Antiplatelet  therapy.  Hypertension with hypertensive cardiovascular disease. Suboptimally controlled today.  Tobacco abuse disorder-uninterested in cessation  Bilateral hand numbness s/p carpel tunnel release    PLAN:  The patient has had no ischemic evaluation since her acute MI in 2012.  Due to the fact that she is recommended to undergo a major surgery, I would recommend nuclear stress testing to evaluate for any evidence of inducible ischemia prior.  This was explained to her and she verbalized understanding.  Nuclear stress testing will be ordered and I will follow-up with the patient via phone regarding next steps.  Continue current CV plan of care to include antiplatelet, antihypertensives, statin.  Recommend complete smoking cessation        CHF Guideline Directed Medical Therapy  Beta Blocker:   ARNI/ACE/ARB:   SGLT 2 inhibitors:   Diuretics:   Aldosterone Antagonist:   Vasodilators & Nitrates:       Diagnoses and all orders for this visit:    1. Preoperative cardiovascular examination (Primary)  -     Stress Test With Myocardial Perfusion One Day; Future    2. Stenosis of left carotid artery  -     Stress Test With Myocardial Perfusion One Day; Future    3. Coronary artery disease involving native coronary artery of native heart without angina pectoris  -     Stress Test With Myocardial Perfusion One Day; Future    4. History of MI (myocardial infarction)          The following portions of the patient's history were reviewed and updated as appropriate: allergies, current medications, past family history, past medical history, past social history, past surgical history, and problem list.    REVIEW OF SYSTEMS:    Review of Systems   All other systems reviewed and are negative.      Vitals:    01/27/25 1047   BP: 143/84   Pulse: 80         PHYSICAL EXAM:    General: Alert, cooperative, no distress, appears stated age  Head:  Normocephalic, atraumatic, mucous membranes moist  Eyes:  Conjunctiva/corneas clear, EOM's intact      Neck:  Supple,  no JVD or bruit     Lungs: Inspiratory and expiratory wheezes bilaterally in bases  Chest wall: No tenderness  Musculoskeletal:   Ambulates freely without assistance  Heart::  Regular rate and rhythm, S1 and S2 normal, no murmur, rub or gallop  Abdomen: Soft, non-tender, nondistended, bowel sounds active, no abdominal bruit  Extremities: No cyanosis, clubbing, or edema   Pulses: 2+ and symmetric all extremities  Skin:  No rashes or lesions  Neuro/psych: A&O x3. CN II through XII are grossly intact with appropriate affect        Past Medical History:   Diagnosis Date    Antiplatelet or antithrombotic long-term use     Coronary artery disease     Dyslipidemia     Hyperlipidemia     Hypertension     Hypertensive cardiovascular disease     Kidney stones     Myocardial infarction        Past Surgical History:   Procedure Laterality Date    BACK SURGERY      CARDIAC CATHETERIZATION      CARPAL TUNNEL RELEASE Left 12/2021    CORONARY ANGIOPLASTY WITH STENT PLACEMENT      RCA- 2 Xience drug eluting stents    KIDNEY STONE SURGERY      x 2         Current Outpatient Medications:     aspirin 81 MG tablet, Take 1 tablet by mouth Daily., Disp: , Rfl:     clopidogrel (PLAVIX) 75 MG tablet, Take 1 tablet by mouth once daily, Disp: 90 tablet, Rfl: 2    metoprolol succinate XL (TOPROL-XL) 25 MG 24 hr tablet, Take 1 tablet by mouth once daily, Disp: 90 tablet, Rfl: 2    rosuvastatin (CRESTOR) 10 MG tablet, Take 1 tablet by mouth once daily, Disp: 90 tablet, Rfl: 2    No Known Allergies    Family History   Problem Relation Age of Onset    No Known Problems Mother     Heart attack Father     Heart attack Sister     Heart attack Brother        Social History     Tobacco Use    Smoking status: Every Day     Current packs/day: 1.00     Average packs/day: 1 pack/day for 18.0 years (18.0 ttl pk-yrs)     Types: Cigarettes     Passive exposure: Current    Smokeless tobacco: Never   Substance Use Topics    Alcohol use: No  "          Current Electrocardiogram:  Procedures        EMR Dragon/Transcription:   \"Dictated utilizing Dragon dictation\".     Copied text in this note has been reviewed by me and is accurate as of 01/28/25.    "

## 2025-02-03 ENCOUNTER — HOSPITAL ENCOUNTER (OUTPATIENT)
Dept: CARDIOLOGY | Facility: HOSPITAL | Age: 80
Discharge: HOME | End: 2025-02-03
Payer: MEDICARE

## 2025-02-03 DIAGNOSIS — Z01.810 PREOPERATIVE CARDIOVASCULAR EXAMINATION: ICD-10-CM

## 2025-02-03 DIAGNOSIS — I25.10 CORONARY ARTERY DISEASE INVOLVING NATIVE CORONARY ARTERY OF NATIVE HEART WITHOUT ANGINA PECTORIS: ICD-10-CM

## 2025-02-03 DIAGNOSIS — I65.22 STENOSIS OF LEFT CAROTID ARTERY: ICD-10-CM

## 2025-02-03 PROCEDURE — A9500 TC99M SESTAMIBI: HCPCS | Performed by: NURSE PRACTITIONER

## 2025-02-03 PROCEDURE — 25010000002 REGADENOSON 0.4 MG/5ML SOLUTION: Performed by: NURSE PRACTITIONER

## 2025-02-03 PROCEDURE — 78452 HT MUSCLE IMAGE SPECT MULT: CPT

## 2025-02-03 PROCEDURE — 34310000005 TECHNETIUM SESTAMIBI: Performed by: NURSE PRACTITIONER

## 2025-02-03 PROCEDURE — 93017 CV STRESS TEST TRACING ONLY: CPT

## 2025-02-03 RX ORDER — REGADENOSON 0.08 MG/ML
0.4 INJECTION, SOLUTION INTRAVENOUS
Status: COMPLETED | OUTPATIENT
Start: 2025-02-03 | End: 2025-02-03

## 2025-02-03 RX ADMIN — TECHNETIUM TC 99M SESTAMIBI 1 DOSE: 1 INJECTION INTRAVENOUS at 11:42

## 2025-02-03 RX ADMIN — TECHNETIUM TC 99M SESTAMIBI 1 DOSE: 1 INJECTION INTRAVENOUS at 11:52

## 2025-02-03 RX ADMIN — REGADENOSON 0.4 MG: 0.08 INJECTION, SOLUTION INTRAVENOUS at 11:53

## 2025-02-05 LAB
BH CV REST NUCLEAR ISOTOPE DOSE: 11.3 MCI
BH CV STRESS COMMENTS STAGE 1: NORMAL
BH CV STRESS DOSE REGADENOSON STAGE 1: 0.4
BH CV STRESS DURATION MIN STAGE 1: 0
BH CV STRESS DURATION SEC STAGE 1: 10
BH CV STRESS NUCLEAR ISOTOPE DOSE: 35.9 MCI
BH CV STRESS PROTOCOL 1: NORMAL
BH CV STRESS RECOVERY BP: NORMAL MMHG
BH CV STRESS RECOVERY HR: 100 BPM
BH CV STRESS STAGE 1: 1
MAXIMAL PREDICTED HEART RATE: 141 BPM
PERCENT MAX PREDICTED HR: 73.05 %
SPECT HRT GATED+EF W RNC IV: 62 %
STRESS BASELINE BP: NORMAL MMHG
STRESS BASELINE HR: 68 BPM
STRESS PERCENT HR: 86 %
STRESS POST PEAK BP: NORMAL MMHG
STRESS POST PEAK HR: 103 BPM
STRESS TARGET HR: 120 BPM

## 2025-02-24 ENCOUNTER — LAB (OUTPATIENT)
Dept: LAB | Facility: HOSPITAL | Age: 80
End: 2025-02-24
Payer: MEDICARE

## 2025-02-24 ENCOUNTER — HOSPITAL ENCOUNTER (OUTPATIENT)
Dept: CARDIOLOGY | Facility: HOSPITAL | Age: 80
Discharge: HOME | End: 2025-02-24
Payer: MEDICARE

## 2025-02-24 ENCOUNTER — HOSPITAL ENCOUNTER (OUTPATIENT)
Dept: GENERAL RADIOLOGY | Facility: HOSPITAL | Age: 80
Discharge: HOME | End: 2025-02-24
Payer: MEDICARE

## 2025-02-24 DIAGNOSIS — I25.10 CORONARY ARTERY DISEASE INVOLVING NATIVE CORONARY ARTERY OF NATIVE HEART WITHOUT ANGINA PECTORIS: ICD-10-CM

## 2025-02-24 DIAGNOSIS — I65.23 CAROTID STENOSIS, BILATERAL: ICD-10-CM

## 2025-02-24 DIAGNOSIS — Z79.02 LONG TERM CURRENT USE OF ANTITHROMBOTICS/ANTIPLATELETS: ICD-10-CM

## 2025-02-24 DIAGNOSIS — I65.22 CAROTID STENOSIS, ASYMPTOMATIC, LEFT: ICD-10-CM

## 2025-02-24 DIAGNOSIS — I11.9 HYPERTENSIVE HEART DISEASE WITHOUT HEART FAILURE: ICD-10-CM

## 2025-02-24 DIAGNOSIS — I10 PRIMARY HYPERTENSION: ICD-10-CM

## 2025-02-24 LAB
ABO GROUP BLD: NORMAL
ALBUMIN SERPL-MCNC: 4.4 G/DL (ref 3.5–5.2)
ALBUMIN/GLOB SERPL: 1.4 G/DL
ALP SERPL-CCNC: 83 U/L (ref 39–117)
ALT SERPL W P-5'-P-CCNC: 18 U/L (ref 1–33)
ANION GAP SERPL CALCULATED.3IONS-SCNC: 10.6 MMOL/L (ref 5–15)
AST SERPL-CCNC: 26 U/L (ref 1–32)
BASOPHILS # BLD AUTO: 0.02 10*3/MM3 (ref 0–0.2)
BASOPHILS NFR BLD AUTO: 0.3 % (ref 0–1.5)
BILIRUB SERPL-MCNC: 0.5 MG/DL (ref 0–1.2)
BLD GP AB SCN SERPL QL: NEGATIVE
BUN SERPL-MCNC: 18 MG/DL (ref 8–23)
BUN/CREAT SERPL: 22.8 (ref 7–25)
CALCIUM SPEC-SCNC: 9.7 MG/DL (ref 8.6–10.5)
CHLORIDE SERPL-SCNC: 103 MMOL/L (ref 98–107)
CHOLEST SERPL-MCNC: 168 MG/DL (ref 0–200)
CO2 SERPL-SCNC: 24.4 MMOL/L (ref 22–29)
CREAT SERPL-MCNC: 0.79 MG/DL (ref 0.57–1)
DEPRECATED RDW RBC AUTO: 43.1 FL (ref 37–54)
EGFRCR SERPLBLD CKD-EPI 2021: 76.2 ML/MIN/1.73
EOSINOPHIL # BLD AUTO: 0.06 10*3/MM3 (ref 0–0.4)
EOSINOPHIL NFR BLD AUTO: 1 % (ref 0.3–6.2)
ERYTHROCYTE [DISTWIDTH] IN BLOOD BY AUTOMATED COUNT: 12.5 % (ref 12.3–15.4)
GLOBULIN UR ELPH-MCNC: 3.1 GM/DL
GLUCOSE SERPL-MCNC: 87 MG/DL (ref 65–99)
HBA1C MFR BLD: 5.92 % (ref 4.8–5.6)
HCT VFR BLD AUTO: 50 % (ref 34–46.6)
HDLC SERPL-MCNC: 49 MG/DL (ref 40–60)
HGB BLD-MCNC: 16.6 G/DL (ref 12–15.9)
IMM GRANULOCYTES # BLD AUTO: 0.02 10*3/MM3 (ref 0–0.05)
IMM GRANULOCYTES NFR BLD AUTO: 0.3 % (ref 0–0.5)
LDLC SERPL CALC-MCNC: 95 MG/DL (ref 0–100)
LDLC/HDLC SERPL: 1.88 {RATIO}
LYMPHOCYTES # BLD AUTO: 1.64 10*3/MM3 (ref 0.7–3.1)
LYMPHOCYTES NFR BLD AUTO: 26.3 % (ref 19.6–45.3)
MCH RBC QN AUTO: 31.1 PG (ref 26.6–33)
MCHC RBC AUTO-ENTMCNC: 33.2 G/DL (ref 31.5–35.7)
MCV RBC AUTO: 93.6 FL (ref 79–97)
MONOCYTES # BLD AUTO: 0.5 10*3/MM3 (ref 0.1–0.9)
MONOCYTES NFR BLD AUTO: 8 % (ref 5–12)
NEUTROPHILS NFR BLD AUTO: 4 10*3/MM3 (ref 1.7–7)
NEUTROPHILS NFR BLD AUTO: 64.1 % (ref 42.7–76)
NRBC BLD AUTO-RTO: 0 /100 WBC (ref 0–0.2)
PLATELET # BLD AUTO: 219 10*3/MM3 (ref 140–450)
PMV BLD AUTO: 9.8 FL (ref 6–12)
POTASSIUM SERPL-SCNC: 4.4 MMOL/L (ref 3.5–5.2)
PROT SERPL-MCNC: 7.5 G/DL (ref 6–8.5)
RBC # BLD AUTO: 5.34 10*6/MM3 (ref 3.77–5.28)
RH BLD: POSITIVE
SODIUM SERPL-SCNC: 138 MMOL/L (ref 136–145)
T&S EXPIRATION DATE: NORMAL
TRIGL SERPL-MCNC: 134 MG/DL (ref 0–150)
VLDLC SERPL-MCNC: 24 MG/DL (ref 5–40)
WBC NRBC COR # BLD AUTO: 6.24 10*3/MM3 (ref 3.4–10.8)

## 2025-02-24 PROCEDURE — 86901 BLOOD TYPING SEROLOGIC RH(D): CPT

## 2025-02-24 PROCEDURE — 86900 BLOOD TYPING SEROLOGIC ABO: CPT

## 2025-02-24 PROCEDURE — 83036 HEMOGLOBIN GLYCOSYLATED A1C: CPT | Performed by: SURGERY

## 2025-02-24 PROCEDURE — 86850 RBC ANTIBODY SCREEN: CPT

## 2025-02-24 PROCEDURE — 85025 COMPLETE CBC W/AUTO DIFF WBC: CPT

## 2025-02-24 PROCEDURE — 93005 ELECTROCARDIOGRAM TRACING: CPT | Performed by: SURGERY

## 2025-02-24 PROCEDURE — 80061 LIPID PANEL: CPT

## 2025-02-24 PROCEDURE — 80053 COMPREHEN METABOLIC PANEL: CPT

## 2025-02-24 PROCEDURE — 36415 COLL VENOUS BLD VENIPUNCTURE: CPT

## 2025-02-24 PROCEDURE — 71045 X-RAY EXAM CHEST 1 VIEW: CPT

## 2025-03-01 LAB
QT INTERVAL: 394 MS
QTC INTERVAL: 432 MS

## 2025-03-04 ENCOUNTER — ANESTHESIA EVENT (OUTPATIENT)
Dept: PERIOP | Facility: HOSPITAL | Age: 80
End: 2025-03-04
Payer: MEDICARE

## 2025-03-05 ENCOUNTER — ANESTHESIA (OUTPATIENT)
Dept: PERIOP | Facility: HOSPITAL | Age: 80
End: 2025-03-05
Payer: MEDICARE

## 2025-03-05 ENCOUNTER — ANCILLARY PROCEDURE (OUTPATIENT)
Dept: PERIOP | Facility: HOSPITAL | Age: 80
End: 2025-03-05
Payer: MEDICARE

## 2025-03-05 ENCOUNTER — HOSPITAL ENCOUNTER (INPATIENT)
Facility: HOSPITAL | Age: 80
LOS: 1 days | Discharge: HOME | End: 2025-03-06
Attending: SURGERY | Admitting: SURGERY
Payer: MEDICARE

## 2025-03-05 DIAGNOSIS — I65.23 CAROTID STENOSIS, BILATERAL: ICD-10-CM

## 2025-03-05 LAB
ACT BLD: 118 SECONDS (ref 89–137)
ACT BLD: 239 SECONDS (ref 89–137)
ACT BLD: 291 SECONDS (ref 89–137)

## 2025-03-05 PROCEDURE — 25010000002 PHENYLEPHRINE 10 MG/ML SOLUTION 5 ML VIAL: Performed by: NURSE ANESTHETIST, CERTIFIED REGISTERED

## 2025-03-05 PROCEDURE — C1894 INTRO/SHEATH, NON-LASER: HCPCS | Performed by: SURGERY

## 2025-03-05 PROCEDURE — 25010000002 BUPIVACAINE 0.25 % SOLUTION: Performed by: SURGERY

## 2025-03-05 PROCEDURE — 25010000002 FENTANYL CITRATE (PF) 100 MCG/2ML SOLUTION: Performed by: NURSE ANESTHETIST, CERTIFIED REGISTERED

## 2025-03-05 PROCEDURE — C1876 STENT, NON-COA/NON-COV W/DEL: HCPCS | Performed by: SURGERY

## 2025-03-05 PROCEDURE — 25010000002 SUGAMMADEX 200 MG/2ML SOLUTION: Performed by: NURSE ANESTHETIST, CERTIFIED REGISTERED

## 2025-03-05 PROCEDURE — C1725 CATH, TRANSLUMIN NON-LASER: HCPCS | Performed by: SURGERY

## 2025-03-05 PROCEDURE — C1769 GUIDE WIRE: HCPCS | Performed by: SURGERY

## 2025-03-05 PROCEDURE — 25010000002 ONDANSETRON PER 1 MG: Performed by: NURSE ANESTHETIST, CERTIFIED REGISTERED

## 2025-03-05 PROCEDURE — 25810000003 SODIUM CHLORIDE 0.9 % SOLUTION 250 ML FLEX CONT: Performed by: NURSE ANESTHETIST, CERTIFIED REGISTERED

## 2025-03-05 PROCEDURE — S0260 H&P FOR SURGERY: HCPCS | Performed by: SURGERY

## 2025-03-05 PROCEDURE — X2AJ336 CEREBRAL EMBOLIC FILTRATION, EXTRACORPOREAL FLOW REVERSAL CIRCUIT FROM LEFT COMMON CAROTID ARTERY, PERCUTANEOUS APPROACH, NEW TECHNOLOGY GROUP 6: ICD-10-PCS | Performed by: SURGERY

## 2025-03-05 PROCEDURE — 37215 TRANSCATH STENT CCA W/EPS: CPT | Performed by: SURGERY

## 2025-03-05 PROCEDURE — 25010000002 PHENYLEPHRINE 10 MG/ML SOLUTION: Performed by: NURSE ANESTHETIST, CERTIFIED REGISTERED

## 2025-03-05 PROCEDURE — 25010000002 LIDOCAINE PF 2% 2 % SOLUTION: Performed by: NURSE ANESTHETIST, CERTIFIED REGISTERED

## 2025-03-05 PROCEDURE — 037J3DZ DILATION OF LEFT COMMON CAROTID ARTERY WITH INTRALUMINAL DEVICE, PERCUTANEOUS APPROACH: ICD-10-PCS | Performed by: SURGERY

## 2025-03-05 PROCEDURE — 25010000002 CEFAZOLIN PER 500 MG: Performed by: SURGERY

## 2025-03-05 PROCEDURE — 25010000002 HEPARIN (PORCINE) PER 1000 UNITS: Performed by: SURGERY

## 2025-03-05 PROCEDURE — 25010000002 PROPOFOL 10 MG/ML EMULSION: Performed by: NURSE ANESTHETIST, CERTIFIED REGISTERED

## 2025-03-05 PROCEDURE — 25010000002 PROTAMINE SULFATE PER 10 MG: Performed by: NURSE ANESTHETIST, CERTIFIED REGISTERED

## 2025-03-05 PROCEDURE — 25010000002 GLYCOPYRROLATE 1 MG/5ML SOLUTION: Performed by: NURSE ANESTHETIST, CERTIFIED REGISTERED

## 2025-03-05 PROCEDURE — 25010000002 DEXAMETHASONE SODIUM PHOSPHATE 20 MG/5ML SOLUTION: Performed by: NURSE ANESTHETIST, CERTIFIED REGISTERED

## 2025-03-05 PROCEDURE — 25510000001 IOPAMIDOL PER 1 ML: Performed by: SURGERY

## 2025-03-05 PROCEDURE — 76937 US GUIDE VASCULAR ACCESS: CPT | Performed by: SURGERY

## 2025-03-05 PROCEDURE — C1884 EMBOLIZATION PROTECT SYST: HCPCS | Performed by: SURGERY

## 2025-03-05 PROCEDURE — 25810000003 LACTATED RINGERS PER 1000 ML: Performed by: NURSE ANESTHETIST, CERTIFIED REGISTERED

## 2025-03-05 PROCEDURE — 25010000002 HEPARIN (PORCINE) PER 1000 UNITS: Performed by: NURSE ANESTHETIST, CERTIFIED REGISTERED

## 2025-03-05 PROCEDURE — 85347 COAGULATION TIME ACTIVATED: CPT

## 2025-03-05 PROCEDURE — 25810000003 SODIUM CHLORIDE 0.9 % SOLUTION 1,000 ML FLEX CONT: Performed by: SURGERY

## 2025-03-05 DEVICE — FLOSEAL WITH RECOTHROM - 5ML
Type: IMPLANTABLE DEVICE | Site: CAROTID | Status: FUNCTIONAL
Brand: FLOSEAL HEMOSTATIC MATRIX

## 2025-03-05 DEVICE — IMPLANTABLE DEVICE: Type: IMPLANTABLE DEVICE | Site: CAROTID | Status: FUNCTIONAL

## 2025-03-05 DEVICE — LIGACLIP MCA MULTIPLE CLIP APPLIERS, 20 SMALL CLIPS
Type: IMPLANTABLE DEVICE | Site: CAROTID | Status: FUNCTIONAL
Brand: LIGACLIP

## 2025-03-05 RX ORDER — NALOXONE HCL 0.4 MG/ML
0.4 VIAL (ML) INJECTION AS NEEDED
Status: DISCONTINUED | OUTPATIENT
Start: 2025-03-05 | End: 2025-03-05 | Stop reason: HOSPADM

## 2025-03-05 RX ORDER — IOPAMIDOL 755 MG/ML
INJECTION, SOLUTION INTRAVASCULAR AS NEEDED
Status: DISCONTINUED | OUTPATIENT
Start: 2025-03-05 | End: 2025-03-05 | Stop reason: HOSPADM

## 2025-03-05 RX ORDER — SODIUM CHLORIDE 0.9 % (FLUSH) 0.9 %
10 SYRINGE (ML) INJECTION EVERY 12 HOURS SCHEDULED
Status: DISCONTINUED | OUTPATIENT
Start: 2025-03-05 | End: 2025-03-05 | Stop reason: HOSPADM

## 2025-03-05 RX ORDER — ACETAMINOPHEN 325 MG/1
975 TABLET ORAL ONCE
Status: COMPLETED | OUTPATIENT
Start: 2025-03-05 | End: 2025-03-05

## 2025-03-05 RX ORDER — EPHEDRINE SULFATE 5 MG/ML
INJECTION INTRAVENOUS AS NEEDED
Status: DISCONTINUED | OUTPATIENT
Start: 2025-03-05 | End: 2025-03-05

## 2025-03-05 RX ORDER — HYDRALAZINE HYDROCHLORIDE 20 MG/ML
5 INJECTION INTRAMUSCULAR; INTRAVENOUS
Status: DISCONTINUED | OUTPATIENT
Start: 2025-03-05 | End: 2025-03-05 | Stop reason: HOSPADM

## 2025-03-05 RX ORDER — SODIUM CHLORIDE 0.9 % (FLUSH) 0.9 %
10 SYRINGE (ML) INJECTION AS NEEDED
Status: DISCONTINUED | OUTPATIENT
Start: 2025-03-05 | End: 2025-03-05 | Stop reason: HOSPADM

## 2025-03-05 RX ORDER — SODIUM CHLORIDE 9 MG/ML
40 INJECTION, SOLUTION INTRAVENOUS AS NEEDED
Status: DISCONTINUED | OUTPATIENT
Start: 2025-03-05 | End: 2025-03-05 | Stop reason: HOSPADM

## 2025-03-05 RX ORDER — FENTANYL CITRATE 50 UG/ML
50 INJECTION, SOLUTION INTRAMUSCULAR; INTRAVENOUS
Status: DISCONTINUED | OUTPATIENT
Start: 2025-03-05 | End: 2025-03-05 | Stop reason: HOSPADM

## 2025-03-05 RX ORDER — OXYCODONE HYDROCHLORIDE 5 MG/1
10 TABLET ORAL EVERY 4 HOURS PRN
Status: DISCONTINUED | OUTPATIENT
Start: 2025-03-05 | End: 2025-03-05 | Stop reason: HOSPADM

## 2025-03-05 RX ORDER — CLOPIDOGREL BISULFATE 75 MG/1
75 TABLET ORAL DAILY
Status: DISCONTINUED | OUTPATIENT
Start: 2025-03-06 | End: 2025-03-06 | Stop reason: HOSPADM

## 2025-03-05 RX ORDER — FENTANYL CITRATE 50 UG/ML
INJECTION, SOLUTION INTRAMUSCULAR; INTRAVENOUS AS NEEDED
Status: DISCONTINUED | OUTPATIENT
Start: 2025-03-05 | End: 2025-03-05 | Stop reason: SURG

## 2025-03-05 RX ORDER — DIPHENHYDRAMINE HYDROCHLORIDE 50 MG/ML
12.5 INJECTION, SOLUTION INTRAMUSCULAR; INTRAVENOUS
Status: DISCONTINUED | OUTPATIENT
Start: 2025-03-05 | End: 2025-03-05 | Stop reason: HOSPADM

## 2025-03-05 RX ORDER — GLYCOPYRROLATE 0.2 MG/ML
INJECTION INTRAMUSCULAR; INTRAVENOUS AS NEEDED
Status: DISCONTINUED | OUTPATIENT
Start: 2025-03-05 | End: 2025-03-05 | Stop reason: SURG

## 2025-03-05 RX ORDER — HYDROCODONE BITARTRATE AND ACETAMINOPHEN 5; 325 MG/1; MG/1
1 TABLET ORAL EVERY 6 HOURS PRN
Status: DISCONTINUED | OUTPATIENT
Start: 2025-03-05 | End: 2025-03-06 | Stop reason: HOSPADM

## 2025-03-05 RX ORDER — IPRATROPIUM BROMIDE AND ALBUTEROL SULFATE 2.5; .5 MG/3ML; MG/3ML
3 SOLUTION RESPIRATORY (INHALATION) ONCE AS NEEDED
Status: DISCONTINUED | OUTPATIENT
Start: 2025-03-05 | End: 2025-03-05 | Stop reason: HOSPADM

## 2025-03-05 RX ORDER — SODIUM CHLORIDE 9 MG/ML
INJECTION, SOLUTION INTRAVENOUS CONTINUOUS PRN
Status: DISCONTINUED | OUTPATIENT
Start: 2025-03-05 | End: 2025-03-05 | Stop reason: SURG

## 2025-03-05 RX ORDER — DEXAMETHASONE SODIUM PHOSPHATE 4 MG/ML
INJECTION, SOLUTION INTRA-ARTICULAR; INTRALESIONAL; INTRAMUSCULAR; INTRAVENOUS; SOFT TISSUE AS NEEDED
Status: DISCONTINUED | OUTPATIENT
Start: 2025-03-05 | End: 2025-03-05 | Stop reason: SURG

## 2025-03-05 RX ORDER — LABETALOL HYDROCHLORIDE 5 MG/ML
5 INJECTION, SOLUTION INTRAVENOUS
Status: DISCONTINUED | OUTPATIENT
Start: 2025-03-05 | End: 2025-03-05 | Stop reason: HOSPADM

## 2025-03-05 RX ORDER — OXYCODONE HYDROCHLORIDE 5 MG/1
5 TABLET ORAL ONCE AS NEEDED
Status: DISCONTINUED | OUTPATIENT
Start: 2025-03-05 | End: 2025-03-05 | Stop reason: HOSPADM

## 2025-03-05 RX ORDER — BUPIVACAINE HYDROCHLORIDE 2.5 MG/ML
INJECTION, SOLUTION INFILTRATION; PERINEURAL AS NEEDED
Status: DISCONTINUED | OUTPATIENT
Start: 2025-03-05 | End: 2025-03-05 | Stop reason: HOSPADM

## 2025-03-05 RX ORDER — ASPIRIN 81 MG/1
81 TABLET ORAL DAILY
Status: DISCONTINUED | OUTPATIENT
Start: 2025-03-06 | End: 2025-03-06 | Stop reason: HOSPADM

## 2025-03-05 RX ORDER — EPHEDRINE SULFATE 5 MG/ML
5 INJECTION INTRAVENOUS ONCE AS NEEDED
Status: DISCONTINUED | OUTPATIENT
Start: 2025-03-05 | End: 2025-03-05 | Stop reason: HOSPADM

## 2025-03-05 RX ORDER — ONDANSETRON 2 MG/ML
INJECTION INTRAMUSCULAR; INTRAVENOUS AS NEEDED
Status: DISCONTINUED | OUTPATIENT
Start: 2025-03-05 | End: 2025-03-05 | Stop reason: SURG

## 2025-03-05 RX ORDER — LIDOCAINE HYDROCHLORIDE 20 MG/ML
INJECTION, SOLUTION EPIDURAL; INFILTRATION; INTRACAUDAL; PERINEURAL AS NEEDED
Status: DISCONTINUED | OUTPATIENT
Start: 2025-03-05 | End: 2025-03-05 | Stop reason: SURG

## 2025-03-05 RX ORDER — PROPOFOL 10 MG/ML
VIAL (ML) INTRAVENOUS AS NEEDED
Status: DISCONTINUED | OUTPATIENT
Start: 2025-03-05 | End: 2025-03-05 | Stop reason: SURG

## 2025-03-05 RX ORDER — DIPHENHYDRAMINE HYDROCHLORIDE 50 MG/ML
12.5 INJECTION, SOLUTION INTRAMUSCULAR; INTRAVENOUS ONCE AS NEEDED
Status: DISCONTINUED | OUTPATIENT
Start: 2025-03-05 | End: 2025-03-05 | Stop reason: HOSPADM

## 2025-03-05 RX ORDER — METOPROLOL SUCCINATE 25 MG/1
25 TABLET, EXTENDED RELEASE ORAL DAILY
Status: DISCONTINUED | OUTPATIENT
Start: 2025-03-06 | End: 2025-03-06 | Stop reason: HOSPADM

## 2025-03-05 RX ORDER — ROSUVASTATIN CALCIUM 10 MG/1
10 TABLET, COATED ORAL DAILY
Status: DISCONTINUED | OUTPATIENT
Start: 2025-03-06 | End: 2025-03-06 | Stop reason: HOSPADM

## 2025-03-05 RX ORDER — ONDANSETRON 2 MG/ML
4 INJECTION INTRAMUSCULAR; INTRAVENOUS ONCE AS NEEDED
Status: DISCONTINUED | OUTPATIENT
Start: 2025-03-05 | End: 2025-03-05 | Stop reason: HOSPADM

## 2025-03-05 RX ORDER — SODIUM CHLORIDE, SODIUM LACTATE, POTASSIUM CHLORIDE, CALCIUM CHLORIDE 600; 310; 30; 20 MG/100ML; MG/100ML; MG/100ML; MG/100ML
INJECTION, SOLUTION INTRAVENOUS CONTINUOUS PRN
Status: DISCONTINUED | OUTPATIENT
Start: 2025-03-05 | End: 2025-03-05 | Stop reason: SURG

## 2025-03-05 RX ORDER — PHENYLEPHRINE HYDROCHLORIDE 10 MG/ML
INJECTION INTRAVENOUS AS NEEDED
Status: DISCONTINUED | OUTPATIENT
Start: 2025-03-05 | End: 2025-03-05 | Stop reason: SURG

## 2025-03-05 RX ORDER — PROTAMINE SULFATE 10 MG/ML
INJECTION, SOLUTION INTRAVENOUS AS NEEDED
Status: DISCONTINUED | OUTPATIENT
Start: 2025-03-05 | End: 2025-03-05 | Stop reason: SURG

## 2025-03-05 RX ORDER — ROCURONIUM BROMIDE 10 MG/ML
INJECTION, SOLUTION INTRAVENOUS AS NEEDED
Status: DISCONTINUED | OUTPATIENT
Start: 2025-03-05 | End: 2025-03-05 | Stop reason: SURG

## 2025-03-05 RX ORDER — DEXMEDETOMIDINE HYDROCHLORIDE 100 UG/ML
INJECTION, SOLUTION INTRAVENOUS AS NEEDED
Status: DISCONTINUED | OUTPATIENT
Start: 2025-03-05 | End: 2025-03-05 | Stop reason: SURG

## 2025-03-05 RX ORDER — HEPARIN SODIUM 1000 [USP'U]/ML
INJECTION, SOLUTION INTRAVENOUS; SUBCUTANEOUS AS NEEDED
Status: DISCONTINUED | OUTPATIENT
Start: 2025-03-05 | End: 2025-03-05 | Stop reason: SURG

## 2025-03-05 RX ORDER — FLUMAZENIL 0.1 MG/ML
0.2 INJECTION INTRAVENOUS AS NEEDED
Status: DISCONTINUED | OUTPATIENT
Start: 2025-03-05 | End: 2025-03-05 | Stop reason: HOSPADM

## 2025-03-05 RX ADMIN — ROCURONIUM BROMIDE 50 MG: 10 INJECTION, SOLUTION INTRAVENOUS at 08:17

## 2025-03-05 RX ADMIN — ROCURONIUM BROMIDE 20 MG: 10 INJECTION, SOLUTION INTRAVENOUS at 08:49

## 2025-03-05 RX ADMIN — CEFAZOLIN 2000 MG: 2 INJECTION, POWDER, FOR SOLUTION INTRAMUSCULAR; INTRAVENOUS at 08:08

## 2025-03-05 RX ADMIN — PHENYLEPHRINE HYDROCHLORIDE 100 MCG: 10 INJECTION INTRAVENOUS at 09:40

## 2025-03-05 RX ADMIN — DEXAMETHASONE SODIUM PHOSPHATE 8 MG: 4 INJECTION, SOLUTION INTRAMUSCULAR; INTRAVENOUS at 09:29

## 2025-03-05 RX ADMIN — HEPARIN SODIUM 5000 UNITS: 1000 INJECTION INTRAVENOUS; SUBCUTANEOUS at 09:08

## 2025-03-05 RX ADMIN — SODIUM CHLORIDE 40 ML: 9 INJECTION, SOLUTION INTRAVENOUS at 07:11

## 2025-03-05 RX ADMIN — ACETAMINOPHEN 975 MG: 325 TABLET, FILM COATED ORAL at 07:11

## 2025-03-05 RX ADMIN — FENTANYL CITRATE 50 MCG: 50 INJECTION, SOLUTION INTRAMUSCULAR; INTRAVENOUS at 08:17

## 2025-03-05 RX ADMIN — PROPOFOL 130 MG: 10 INJECTION, EMULSION INTRAVENOUS at 08:17

## 2025-03-05 RX ADMIN — ONDANSETRON 4 MG: 2 INJECTION, SOLUTION INTRAMUSCULAR; INTRAVENOUS at 09:29

## 2025-03-05 RX ADMIN — PHENYLEPHRINE HYDROCHLORIDE 200 MCG: 10 INJECTION INTRAVENOUS at 09:34

## 2025-03-05 RX ADMIN — LIDOCAINE HYDROCHLORIDE 20 MG: 20 INJECTION, SOLUTION EPIDURAL; INFILTRATION; INTRACAUDAL; PERINEURAL at 08:17

## 2025-03-05 RX ADMIN — PROPOFOL 50 MG: 10 INJECTION, EMULSION INTRAVENOUS at 09:29

## 2025-03-05 RX ADMIN — PROTAMINE SULFATE 50 MG: 10 INJECTION, SOLUTION INTRAVENOUS at 09:22

## 2025-03-05 RX ADMIN — DEXMEDETOMIDINE HYDROCHLORIDE 4 MCG: 100 INJECTION, SOLUTION, CONCENTRATE INTRAVENOUS at 09:32

## 2025-03-05 RX ADMIN — PHENYLEPHRINE HYDROCHLORIDE 100 MCG: 10 INJECTION INTRAVENOUS at 09:03

## 2025-03-05 RX ADMIN — SUGAMMADEX 200 MG: 100 INJECTION, SOLUTION INTRAVENOUS at 09:29

## 2025-03-05 RX ADMIN — PHENYLEPHRINE HYDROCHLORIDE 0.4 MCG/KG/MIN: 10 INJECTION INTRAVENOUS at 08:17

## 2025-03-05 RX ADMIN — HEPARIN SODIUM 8000 UNITS: 1000 INJECTION INTRAVENOUS; SUBCUTANEOUS at 08:54

## 2025-03-05 RX ADMIN — GLYCOPYRROLATE 0.2 MCG: 0.2 INJECTION INTRAMUSCULAR; INTRAVENOUS at 08:47

## 2025-03-05 RX ADMIN — SODIUM CHLORIDE, SODIUM LACTATE, POTASSIUM CHLORIDE, AND CALCIUM CHLORIDE: .6; .31; .03; .02 INJECTION, SOLUTION INTRAVENOUS at 08:10

## 2025-03-05 RX ADMIN — FENTANYL CITRATE 50 MCG: 50 INJECTION, SOLUTION INTRAMUSCULAR; INTRAVENOUS at 09:29

## 2025-03-05 RX ADMIN — SODIUM CHLORIDE: 9 INJECTION, SOLUTION INTRAVENOUS at 08:21

## 2025-03-05 NOTE — ANESTHESIA PREPROCEDURE EVALUATION
Anesthesia Evaluation     Patient summary reviewed and Nursing notes reviewed   NPO Solid Status: > 8 hours  NPO Liquid Status: > 8 hours           Airway   Mallampati: II  TM distance: >3 FB  Neck ROM: full  No difficulty expected  Dental - normal exam     Pulmonary - normal exam    breath sounds clear to auscultation  (+) a smoker Current, Abstained day of surgery, cigarettes,  Cardiovascular - normal exam  Exercise tolerance: unable to assess    ECG reviewed  Rhythm: regular  Rate: normal    (+) hypertension, past MI  6-12 months, CAD, cardiac stents unknown timeframe , hyperlipidemia,  carotid artery disease left carotid    ROS comment: Stress  Interpretation Summary       ·  No scintigraphic evidence for provokable ischemia  ·  Fixed inferior basilar defect likely secondary to technical factors given normal wall motion in this area  ·  Impressions are consistent with a low risk study.  ·  Left ventricular ejection fraction is normal (Calculated EF = 62%).  ·  GI artifact is present.  ·  There is no prior study available for comparison.  ·  Findings consistent with a normal ECG stress test.  ·  Clinical correlation suggested            Neuro/Psych- negative ROS  GI/Hepatic/Renal/Endo    (+) renal disease- stones    Musculoskeletal (-) negative ROS    Abdominal  - normal exam   Substance History - negative use     OB/GYN negative ob/gyn ROS         Other - negative ROS       ROS/Med Hx Other: Vascular surgery following for:  Carotid stenosis     Diagnoses and all orders for this visit:     1. Carotid stenosis, bilateral (Primary)  -     Case Request; Standing  -     CBC (No Diff); Future  -     Basic Metabolic Panel; Future  -     XR Chest 1 View; Future  -     ECG 12 Lead; Future  -     Type & Screen; Future  -     sodium chloride 0.9 % flush 10 mL  -     sodium chloride 0.9 % flush 10 mL  -     sodium chloride 0.9 % infusion 40 mL  -     ceFAZolin (ANCEF) 2,000 mg in sodium chloride 0.9 % 100 mL IVPB  -      acetaminophen (TYLENOL) tablet 975 mg  -     Case Request  -     Ambulatory Referral to Cardiology     2. Cigarette smoker     Other orders  -     Inpatient Admission; Standing  -     Follow Anesthesia Guidelines / Protocol; Future  -     Follow Anesthesia Guidelines / Protocol; Standing  -     Chlorhexidine Skin Prep; Standing  -     Place Sequential Compression Device; Standing  -     Chlorhexidine Skin Prep; Standing  -     Place Sequential Compression Device; Standing  -     Provide NPO Instructions to Patient; Future  -     Chlorhexidine Skin Prep; Future  -     Provide Patient With Instructions on NPO Status; Future  -     Provide Chlorhexidine Skin Prep Wipes and Instructions; Future  -     Verify NPO Status; Future  -     Insert Peripheral IV x2; Standing  -     Saline Lock & Maintain IV Access; Standing        Pleasant 79-year-old lady severe left carotid artery focal stenosis.  Increased risk of stroke.  Recommended left transcarotid arterial stent placement.  Risk benefits discussed.  Will reach out to the patient's cardiologist Dr. Brad Arnett to ensure she is cleared for surgery.  All questions answered.  Patient should continue her aspirin and Plavix uninterrupted.     Patient has been recommended for carotid surgery.  This is a major surgery.  Patient understands the inherent risks associated with carotid surgery.  These include but not are not limited to stroke, heart attack, bleeding, infection, need for secondary surgery/intervention, cranial nerve injury, and even death.  Patient also understands the nature of carotid disease and if a left on surgically treated would put them at increased risk for strokes in the future.       She is currently smoking.  We discussed the need for tobacco cessation        Vascular medical management: Patient should continue aspirin 81 mg daily, Plavix 75 mg daily, and Crestor 10 mg daily for  No follow-ups on file.  Patient was given instructions and counseling  regarding her condition or for health maintenance advice. Please see specific information pulled into the AVS if appropriate.                   Anesthesia Plan    ASA 3     general and Jordyn     intravenous induction     Anesthetic plan, risks, benefits, and alternatives have been provided, discussed and informed consent has been obtained with: patient.      CODE STATUS:

## 2025-03-05 NOTE — ANESTHESIA PROCEDURE NOTES
Airway  Urgency: elective    Date/Time: 3/5/2025 8:19 AM  Airway not difficult    General Information and Staff    Patient location during procedure: OR  CRNA/CAA: Irwin April JOSÉ MIGUEL, CRNA    Indications and Patient Condition  Indications for airway management: airway protection    Preoxygenated: yes  Mask difficulty assessment: 2 - vent by mask + OA or adjuvant +/- NMBA    Final Airway Details  Final airway type: endotracheal airway      Successful airway: ETT  Cuffed: yes   Successful intubation technique: video laryngoscopy  Facilitating devices/methods: intubating stylet  Endotracheal tube insertion site: oral  Blade: Rae  Blade size: 3  Cormack-Lehane Classification: grade I - full view of glottis  Placement verified by: capnometry   Measured from: lips  ETT/EBT  to lips (cm): 21  Number of attempts at approach: 1  Assessment: lips, teeth, and gum same as pre-op and atraumatic intubation

## 2025-03-05 NOTE — ANESTHESIA PROCEDURE NOTES
Arterial Line      Patient reassessed immediately prior to procedure    Patient location during procedure: OR  Start time: 3/5/2025 8:20 AM  Stop Time:3/5/2025 8:30 AM       Line placed for hemodynamic monitoring.  Performed By   Anesthesiologist: Jeffrey Rainey MD   Preanesthetic Checklist  Completed: patient identified, IV checked, site marked, risks and benefits discussed, surgical consent, monitors and equipment checked, pre-op evaluation and timeout performed  Arterial Line Prep    Sterile Tech: gloves  Prep: ChloraPrep  Patient monitoring: continuous pulse oximetry, EKG and blood pressure monitoring  Arterial Line Procedure   Laterality:right  Location:  radial artery  Catheter size: 20 G   Guidance: ultrasound guided and palpation technique  Number of attempts: 1  Successful placement: yes   Post Assessment   Dressing Type: occlusive dressing applied, secured with tape and wrist guard applied.   Complications no  Circ/Move/Sens Assessment: normal.   Patient Tolerance: patient tolerated the procedure well with no apparent complications  Additional Notes  Failed Right, 2 attempts son Left

## 2025-03-05 NOTE — OP NOTE
Date of Admission:  3/5/2025  Today's Date:  03/05/25  Dhruv Luna MD  [facility]    Preoperative Diagnosis:   Severe asymptomatic carotid artery stenosis.    Postoperative Diagnosis:   Same    Procedure Performed:   Left transcervical carotid artery stent placement with flow reversal.  Ultrasound-guided vascular access of the Right common femoral vein    Surgeon:   Dhruv Luna MD    Assistant:    Karolyn JACK, Provided critical assistance in exposure, retraction, and suction that overall decrease blood loss and operative time.    Anesthesia:   General    Estimated Blood Loss:   25-50 cc    Findings:    Successful left transcarotid arterial stent placement with flow reversal.  Successful ultrasound-guided right common femoral artery percutaneous access.        Implants:    Implant Name Type Inv. Item Serial No.  Lot No. LRB No. Used Action   CLIPAPPLR M/ ENDO LIGACLIP 9 3/8IN SM - [id number] Implant CLIPAPPLR M/ ENDO LIGACLIP 9 3/8IN SM  [employer]  [employer] J [employer] J [id number] Left 1 Implanted   KT SEAL HEMOS ABS FLOSEAL MATRX 1.5/FAST/PREP 5000/IU 5ML - [id number] Implant KT SEAL HEMOS ABS FLOSEAL MATRX 1.5/FAST/PREP 5000/IU 5ML  Formerly [facility] [id number] Left 1 Implanted   STNT TRANSCAROTID ENROUTE TAPR W/DS 5F [id number] - [id number] Stent STNT TRANSCAROTID ENROUTE TAPR W/DS 5F [id number]  [facility] [id number] Left 1 Implanted       Staff:   Circulator: Naz Hansen RN  Radiology Technologist: Collins Mahoney, RRT; Yesi Pelaez  Scrub Person: Shobha Russell  Vendor Representative: Noman Aldana  Assistant: Karolyn Jarrett CSA    Specimen:   none    Complications:   none    Dispo:   to PACU    Indication for procedure:   79 y.o. female with very symptomatic left carotid artery stenosis.  She submits today for left transcarotid arterial stent placement.  Risk benefits discussed.    Patient has been recommended for carotid surgery.  This is a major surgery.   Patient understands the inherent risks associated with carotid surgery.  These include but not are not limited to stroke, heart attack, bleeding, infection, need for secondary surgery/intervention, cranial nerve injury, and even death.  Patient also understands the nature of carotid disease and if a left on surgically treated would put them at increased risk for strokes in the future.    Shared Decision Making Discussion:  A discussion of treatment options for carotid artery stenosis was conducted.  This includes carotid endarterectomy and carotid artery stenting (both transfemoral and TCAR), and optimal medical therapy.  An explanation of the risk and benefits for each specific option were discussed with the patient.  This included integration of clinical guidelines with the patient's comorbidities and concomitant treatments.  A discussion and incorporation of the patient's personal preferences and priorities was also conducted to help choose a treatment plan.     Description of procedure:   Patient was taken to the operating room.  Anesthesia was induced without difficulty.  Surgical sites were prepped and draped in the usual sterile manner.  A full surgical timeout was performed.  Incision was made just distal to the clavicle over the proximal common carotid artery with a 15 blade scalpel.  Bovie electrocautery was used to dissect through the platysma muscle.  Heads of the sternocleidomastoid were divided.  Carotid sheath was entered.  Vagus nerve was identified and protected throughout the case.  Jugular vein was dissected off the common carotid artery.  Circumferential control was performed of the common carotid artery with an umbilical tape.  Heparin was administered.  Serial ACT's were checked through the case to ensure adequate therapeutic anticoagulation.  5-0 Prolene suture was placed in the common carotid artery and a figure-of-eight configuration.    Ultrasound-guided femoral vein access contralateral to  the carotid and incision was performed.  Microwire was placed without difficulty.  Wire placement confirmed with fluoroscopy.  Microsheath placed followed by 035 Glidewire.  The venous return sheath from the ReferStar system was placed under fluoroscopic guidance and flushed appropriately.    After adequate ACT's, blood pressure, and heart rate were confirmed microneedle access of the common carotid artery was performed and microwire placed in 2 cm.  Microneedle removed and microsheath placed to 2 cm.  Access arteriogram performed in 2 orthogonal projections confirmed atraumatic common carotid artery access and defined carotid artery lesion.  Supplied stiff J-guidewire placed followed by the carotid artery sheath.  Care was taken not to engage the lesion with the wires or sheath.  The filter was hooked up between the cervical sheath and the venous sheath and passive flow reversal started.  2 projection arteriogram confirmed atraumatic access.  Sheath was secured with silk sutures.  Common carotid artery was secured with a Wen tourniquet.  Flow reversal was confirmed with angiogram.  Internal carotid artery lesion was crossed under fluoroscopic guidance with the 014 guidewire.  Predilatation was performed of the lesions.  Ultimately the stent was deployed with good apposition proximally and distally.  No postdilatation was performed as the stent was thought to be well expanded except for some minimal/not flow-limiting wasting.  2 minutes of flow active flow reversal was performed.  All wires catheters and sheaths were removed.  Figure-of-eight suture secured for good hemostasis.  Doppler was used to interrogate common carotid artery flow.  There is no defect.  Protamine was administered.  Meticulous hemostasis confirmed.  Sternocleidomastoid was reapproximated with 3-0 Vicryl platysma with 4-0 Vicryl and skin with a 4-0 Vicryl in a subcuticular manner.  Skin glue applied.  Femoral sheath was removed with direct  pressure for good hemostasis.    At case completion all counts were correct x2.    Patient woke up anesthesia with no gross motor or neurologic deficits.    Dhruv Luna MD  03/05/25     Active Hospital Problems    Diagnosis  POA    **Carotid stenosis, bilateral [I65.23]  Unknown    Carotid artery disease [I77.9]  Yes      Resolved Hospital Problems   No resolved problems to display.

## 2025-03-05 NOTE — ANESTHESIA POSTPROCEDURE EVALUATION
Patient: Didi Ceballos    Procedure Summary       Date: 03/05/25 Room / Location: [facility] OR  / [facility] HYBRID OR    Anesthesia Start: 0810 Anesthesia Stop: 1005    Procedure: Left transcarotid arterial stent placement. (Left: Neck) Diagnosis:       Carotid stenosis, bilateral      (Carotid stenosis, bilateral [I65.23])    Surgeons: Dhruv Luna MD Provider: Jeffrey Rainey MD    Anesthesia Type: generalJordyn ASA Status: 3            Anesthesia Type: general, Jordyn    Vitals  Vitals Value Taken Time   /54 03/05/25 1044   Temp 97.5 °F (36.4 °C) 03/05/25 1044   Pulse 72 03/05/25 1044   Resp 11 03/05/25 1044   SpO2 92 % 03/05/25 1044           Post Anesthesia Care and Evaluation    Patient location during evaluation: PACU  Patient participation: complete - patient participated  Level of consciousness: awake  Pain scale: See nurse's notes for pain score.  Pain management: adequate    Airway patency: patent  Anesthetic complications: No anesthetic complications  PONV Status: none  Cardiovascular status: acceptable  Respiratory status: acceptable and spontaneous ventilation  Hydration status: acceptable    Comments: Patient seen and examined postoperatively; vital signs stable; SpO2 greater than or equal to 90%; cardiopulmonary status stable; nausea/vomiting adequately controlled; pain adequately controlled; no apparent anesthesia complications; patient discharged from anesthesia care when discharge criteria were met

## 2025-03-05 NOTE — H&P
[facility]   PREOPERATIVE HISTORY AND PHYSICAL    Patient Name:Didi Ceballos  : 1945  MRN: [id number]  Primary Care Physician: Ptaience Sanchez APRN  Date of admission: 3/5/2025    Subjective   Subjective     Chief Complaint: preoperative evaluation    History of Present Illness  Didi Ceballos is a 79 y.o. female who presents for preoperative evaluation. She is scheduled for Left transcarotid arterial stent placement. (Left)    Review of Systems     Personal History     Past Medical History:   Diagnosis Date    Antiplatelet or antithrombotic long-term use     Coronary artery disease     Dyslipidemia     Hyperlipidemia     Hypertension     Hypertensive cardiovascular disease     Kidney stones     Myocardial infarction        Past Surgical History:   Procedure Laterality Date    BACK SURGERY      CARDIAC CATHETERIZATION      CARPAL TUNNEL RELEASE Left 2021    CORONARY ANGIOPLASTY WITH STENT PLACEMENT      RCA- 2 Xience drug eluting stents    KIDNEY STONE SURGERY      x 2       Family History: Her family history includes Heart attack in her brother, father, and sister; No Known Problems in her mother.     Social History: She  reports that she has been smoking cigarettes. She has a 18 pack-year smoking history. She has been exposed to tobacco smoke. She has never used smokeless tobacco. She reports that she does not drink alcohol and does not use drugs.    Home Medications:  aspirin, clopidogrel, metoprolol succinate XL, and rosuvastatin    Allergies:  She has No Known Allergies.    Objective    Objective     Vitals:    Temp:  [98 °F (36.7 °C)] 98 °F (36.7 °C)  Heart Rate:  [70] 70  Resp:  [16] 16  BP: (134)/(71) 134/71    Physical Exam  Constitutional:       Appearance: She is well-developed.   Pulmonary:      Effort: Pulmonary effort is normal. No respiratory distress.   Abdominal:      General: There is no distension.      Palpations: Abdomen is soft.   Neurological:      Mental Status: She is alert and  oriented to person, place, and time.         Assessment & Plan   Assessment / Plan     Brief Patient Summary:  Didi Ceballos is a 79 y.o. female who presents for preoperative evaluation.    Pre-Op Diagnosis Codes:      * Carotid stenosis, bilateral [I65.23]    Active Hospital Problems:  Active Hospital Problems    Diagnosis     **Carotid stenosis, bilateral     Carotid artery disease      Plan:   Procedure(s):  Left transcarotid arterial stent placement.    Patient has been recommended for carotid surgery.  This is a major surgery.  Patient understands the inherent risks associated with carotid surgery.  These include but not are not limited to stroke, heart attack, bleeding, infection, need for secondary surgery/intervention, cranial nerve injury, and even death.  Patient also understands the nature of carotid disease and if a left on surgically treated would put them at increased risk for strokes in the future.  .    Dhruv Luna MD

## 2025-03-06 VITALS
SYSTOLIC BLOOD PRESSURE: 100 MMHG | OXYGEN SATURATION: 96 % | TEMPERATURE: 98.1 F | DIASTOLIC BLOOD PRESSURE: 45 MMHG | HEIGHT: 62 IN | WEIGHT: 142.8 LBS | HEART RATE: 63 BPM | BODY MASS INDEX: 26.28 KG/M2 | RESPIRATION RATE: 12 BRPM

## 2025-03-06 PROCEDURE — 25010000002 ENOXAPARIN PER 10 MG: Performed by: SURGERY

## 2025-03-06 PROCEDURE — 25810000003 LACTATED RINGERS PER 1000 ML: Performed by: SURGERY

## 2025-03-06 PROCEDURE — 99024 POSTOP FOLLOW-UP VISIT: CPT | Performed by: NURSE PRACTITIONER

## 2025-03-06 RX ORDER — HYDRALAZINE HYDROCHLORIDE 20 MG/ML
10 INJECTION INTRAMUSCULAR; INTRAVENOUS EVERY 4 HOURS PRN
Status: DISCONTINUED | OUTPATIENT
Start: 2025-03-06 | End: 2025-03-06 | Stop reason: HOSPADM

## 2025-03-06 RX ORDER — ONDANSETRON 2 MG/ML
4 INJECTION INTRAMUSCULAR; INTRAVENOUS EVERY 6 HOURS PRN
Status: DISCONTINUED | OUTPATIENT
Start: 2025-03-06 | End: 2025-03-06 | Stop reason: HOSPADM

## 2025-03-06 RX ORDER — NITROGLYCERIN 0.4 MG/1
0.4 TABLET SUBLINGUAL
Status: DISCONTINUED | OUTPATIENT
Start: 2025-03-06 | End: 2025-03-06 | Stop reason: HOSPADM

## 2025-03-06 RX ORDER — ENOXAPARIN SODIUM 100 MG/ML
40 INJECTION SUBCUTANEOUS DAILY
Status: DISCONTINUED | OUTPATIENT
Start: 2025-03-06 | End: 2025-03-06 | Stop reason: HOSPADM

## 2025-03-06 RX ORDER — SODIUM CHLORIDE, SODIUM LACTATE, POTASSIUM CHLORIDE, CALCIUM CHLORIDE 600; 310; 30; 20 MG/100ML; MG/100ML; MG/100ML; MG/100ML
50 INJECTION, SOLUTION INTRAVENOUS CONTINUOUS
Status: DISCONTINUED | OUTPATIENT
Start: 2025-03-06 | End: 2025-03-06 | Stop reason: HOSPADM

## 2025-03-06 RX ORDER — ONDANSETRON 4 MG/1
4 TABLET, ORALLY DISINTEGRATING ORAL EVERY 6 HOURS PRN
Status: DISCONTINUED | OUTPATIENT
Start: 2025-03-06 | End: 2025-03-06 | Stop reason: HOSPADM

## 2025-03-06 RX ORDER — NITROGLYCERIN 0.4 MG/1
0.4 TABLET SUBLINGUAL
Status: DISCONTINUED | OUTPATIENT
Start: 2025-03-06 | End: 2025-03-06

## 2025-03-06 RX ORDER — ALBUTEROL SULFATE 0.83 MG/ML
2.5 SOLUTION RESPIRATORY (INHALATION) EVERY 6 HOURS PRN
Status: DISCONTINUED | OUTPATIENT
Start: 2025-03-06 | End: 2025-03-06 | Stop reason: HOSPADM

## 2025-03-06 RX ADMIN — SODIUM CHLORIDE, SODIUM LACTATE, POTASSIUM CHLORIDE, CALCIUM CHLORIDE 50 ML/HR: 20; 30; 600; 310 INJECTION, SOLUTION INTRAVENOUS at 08:40

## 2025-03-06 RX ADMIN — ENOXAPARIN SODIUM 40 MG: 100 INJECTION SUBCUTANEOUS at 08:39

## 2025-03-06 RX ADMIN — CLOPIDOGREL BISULFATE 75 MG: 75 TABLET ORAL at 08:39

## 2025-03-06 RX ADMIN — ROSUVASTATIN CALCIUM 10 MG: 10 TABLET, FILM COATED ORAL at 08:39

## 2025-03-06 RX ADMIN — ASPIRIN 81 MG: 81 TABLET, COATED ORAL at 08:39

## 2025-03-06 NOTE — DISCHARGE SUMMARY
Name: Didi Ceballos ADMIT: 3/5/2025   : 1945  PCP: Patience Sanchez APRN    MRN: [id number] LOS: 1 days   AGE/SEX: 79 y.o. female  ROOM: [facility] 3103/1     Date of Admission: 3/5/2025  Date of Discharge:  3/6/2025    PCP: Patience Sanchez APRN      DISCHARGE DIAGNOSIS  Active Hospital Problems    Diagnosis  POA    **Carotid stenosis, bilateral [I65.23]  Unknown    Carotid artery disease [I77.9]  Yes      Resolved Hospital Problems   No resolved problems to display.       SECONDARY DIAGNOSES  Past Medical History:   Diagnosis Date    Antiplatelet or antithrombotic long-term use     Coronary artery disease     Dyslipidemia     Hyperlipidemia     Hypertension     Hypertensive cardiovascular disease     Kidney stones     Myocardial infarction        CONSULTS   Consults       No orders found for last 30 day(s).            PROCEDURES PERFORMED    Date: 3/5/2025 left TCAR    HOSPITAL COURSE  Didi Ceballos is a 79 y.o. femalewith carotid stenosis.  On 3/5/2025, she underwent an elective left TCAR by Dr. Luna. The patient tolerated the procedure well, blood loss was minimal. She was transferred to the PACU postoperatively. She recovered from anesthesia without incident. She was able to maintain her own airway and her vital signs returned to within normal limits. She was transferred to the unit to continue her recovery.  The patient was able to tolerate a regular diet, void on her own, and ambulate without difficulties.  Her surgical incisions remained intact, soft, no drainage or erythema, no signs of hematoma.  She remained neurologically intact, tongue midline, no focal deficits noted.  Pain was well-controlled on oral pain medication.  Patient prefers to discharge home with Tylenol for pain control.  She remains on DAPT and statin.  Patient was evaluated and deemed ready for discharge.  Verbal and written discharge instructions were provided to the patient.  She verbalized understanding of these.  She will  "follow-up with Dr. Luna in 2 weeks for a postop visit/incision check.        VITAL SIGNS  /42 (Patient Position: Lying) Comment (BP Location): laly  Pulse 60   Temp 98 °F (36.7 °C) (Oral)   Resp 13   Ht 157.5 cm (62\")   Wt 64.8 kg (142 lb 12.8 oz)   LMP  (LMP Unknown)   SpO2 97%   BMI 26.12 kg/m²   Objective:  Vital signs: (most recent): Blood pressure 103/42, pulse 60, temperature 98 °F (36.7 °C), temperature source Oral, resp. rate 13, height 157.5 cm (62\"), weight 64.8 kg (142 lb 12.8 oz), SpO2 97%.                Physical Exam:   NAD, alert and oriented, moves all extremities equally, tongue midline  RRR  Lungs clear  Abd soft, benign  Vascular: Palpable bilateral radial pulses  Skin: Left supraclavicular neck incision is CDI and soft, no signs of hematoma    CONDITION ON DISCHARGE  Stable.      DISCHARGE DISPOSITION   Home or Self Care      DISCHARGE MEDICATIONS     Discharge Medications        Continue These Medications        Instructions Start Date   aspirin 81 MG tablet   81 mg, Daily      clopidogrel 75 MG tablet  Commonly known as: PLAVIX   75 mg, Oral, Daily      metoprolol succinate XL 25 MG 24 hr tablet  Commonly known as: TOPROL-XL   25 mg, Oral, Daily      rosuvastatin 10 MG tablet  Commonly known as: CRESTOR   10 mg, Oral, Daily                Future Appointments   Date Time Provider Department Center   5/9/2025  9:30 AM Sushil Cantu DO [facility]     Additional Instructions for the Follow-ups that You Need to Schedule       Call MD With Problems / Concerns   As directed      Instructions:  Call office at [phone] for any drainage, increased redness, or fever over 101.5    Order Comments: Instructions:  Call office at [phone] for any drainage, increased redness, or fever over 101.5                Follow-up Information       Dhruv Luna MD Follow up in 2 week(s).    Specialty: Vascular Surgery  Why: Postop visit  Contact information:  [address] " [address] [address]  [phone]                             TEST  RESULTS PENDING AT DISCHARGE         Isabella Chew, APRN  03/06/25  09:36 EST  O: [phone]

## 2025-03-06 NOTE — CASE MANAGEMENT/SOCIAL WORK
Case Management Discharge Note      Final Note: Home.       Transportation Services  Private: Car    Final Discharge Disposition Code: 01 - home or self-care

## 2025-03-06 NOTE — CASE MANAGEMENT/SOCIAL WORK
Continued Stay Note  GREGORY Reilly     Patient Name: Didi Ceballos  MRN: [id number]  Today's Date: 3/6/2025    Admit Date: 3/5/2025    Plan: CM assessment.   Discharge Plan       Row Name 03/06/25 1325       Plan    Plan CM assessment.    Plan Comments Patient dc before CM could do CM assessment.                 Expected Discharge Date and Time       Expected Discharge Date Expected Discharge Time    Mar 6, 2025                  Jacquelyn Holley RN

## 2025-03-17 ENCOUNTER — OFFICE VISIT (OUTPATIENT)
Age: 80
End: 2025-03-17
Payer: MEDICARE

## 2025-03-17 VITALS
DIASTOLIC BLOOD PRESSURE: 86 MMHG | SYSTOLIC BLOOD PRESSURE: 146 MMHG | WEIGHT: 142 LBS | HEIGHT: 62 IN | BODY MASS INDEX: 26.13 KG/M2 | HEART RATE: 86 BPM | OXYGEN SATURATION: 96 %

## 2025-03-17 DIAGNOSIS — F17.210 CIGARETTE SMOKER: ICD-10-CM

## 2025-03-17 DIAGNOSIS — Z95.828 PRESENCE OF INTERNAL CAROTID STENT: ICD-10-CM

## 2025-03-17 DIAGNOSIS — I65.23 CAROTID STENOSIS, BILATERAL: Primary | ICD-10-CM

## 2025-03-17 PROCEDURE — 3077F SYST BP >= 140 MM HG: CPT | Performed by: SURGERY

## 2025-03-17 PROCEDURE — 99024 POSTOP FOLLOW-UP VISIT: CPT | Performed by: SURGERY

## 2025-03-17 PROCEDURE — 1159F MED LIST DOCD IN RCRD: CPT | Performed by: SURGERY

## 2025-03-17 PROCEDURE — 1160F RVW MEDS BY RX/DR IN RCRD: CPT | Performed by: SURGERY

## 2025-03-17 PROCEDURE — 3079F DIAST BP 80-89 MM HG: CPT | Performed by: SURGERY

## 2025-03-17 NOTE — PATIENT INSTRUCTIONS
Nutrition and Heart Health  In this video, you'll learn why nutrition is an important part of a healthy lifestyle, especially if you have heart disease.  To view the content, go to this web address:  [email or web address]    This video will  on: 2026. If you need access to this video following this date, please reach out to the healthcare provider who assigned it to you.  This information is not intended to replace advice given to you by your health care provider. Make sure you discuss any questions you have with your health care provider.  [employer] Patient Education ©  Elsevier Inc. Smoking Cessation  You will learn methods to help you quit smoking and how quitting can help prevent a variety of health problems and improve your health.  To view the content, go to this web address:  [email or web address]    This video will  on: 2026. If you need access to this video following this date, please reach out to the healthcare provider who assigned it to you.  This information is not intended to replace advice given to you by your health care provider. Make sure you discuss any questions you have with your health care provider.  [employer] Patient Education ©  [employer]vier Inc.        Steps to Quit Smoking  Smoking tobacco is the leading cause of preventable death. It can affect almost every organ in the body. Smoking puts you and people around you at risk for many serious, long-lasting (chronic) diseases. Quitting smoking can be hard, but it is one of the best things that you can do for your health. It is never too late to quit.  Do not give up if you cannot quit the first time. Some people need to try many times to quit. Do your best to stick to your quit plan, and talk with your doctor if you have any questions or concerns.  How do I get ready to quit?  Pick a date to quit. Set a date within the next 2 weeks to give you time to prepare.  Write down the reasons why you are  quitting. Keep this list in places where you will see it often.  Tell your family, friends, and co-workers that you are quitting. Their support is important.  Talk with your doctor about the choices that may help you quit.  Find out if your health insurance will pay for these treatments.  Know the people, places, things, and activities that make you want to smoke (triggers). Avoid them.  What first steps can I take to quit smoking?  Throw away all cigarettes at home, at work, and in your car.  Throw away the things that you use when you smoke, such as ashtrays and lighters.  Clean your car. Empty the ashtray.  Clean your home, including curtains and carpets.  What can I do to help me quit smoking?  Talk with your doctor about taking medicines and seeing a counselor. You are more likely to succeed when you do both.  If you are pregnant or breastfeeding:  Talk with your doctor about counseling or other ways to quit smoking.  Do not take medicine to help you quit smoking unless your doctor tells you to.  Quit right away  Quit smoking completely, instead of slowly cutting back on how much you smoke over a period of time. Stopping smoking right away may be more successful than slowly quitting.  Go to counseling. In-person is best if this is an option. You are more likely to quit if you go to counseling sessions regularly.  Take medicine  You may take medicines to help you quit. Some medicines need a prescription, and some you can buy over-the-counter. Some medicines may contain a drug called nicotine to replace the nicotine in cigarettes. Medicines may:  Help you stop having the desire to smoke (cravings).  Help to stop the problems that come when you stop smoking (withdrawal symptoms).  Your doctor may ask you to use:  Nicotine patches, gum, or lozenges.  Nicotine inhalers or sprays.  Non-nicotine medicine that you take by mouth.  Find resources  Find resources and other ways to help you quit smoking and remain  smoke-free after you quit. They include:  Online chats with a counselor.  Phone quitlines.  Printed self-help materials.  Support groups or group counseling.  Text messaging programs.  Mobile phone apps. Use apps on your mobile phone or tablet that can help you stick to your quit plan. Examples of free services include Quit Guide from the CDC and smokefree.gov    What can I do to make it easier to quit?    Talk to your family and friends. Ask them to support and encourage you.  Call a phone quitline, such as 1-800-QUIT-NOW, reach out to support groups, or work with a counselor.  Ask people who smoke to not smoke around you.  Avoid places that make you want to smoke, such as:  Bars.  Parties.  Smoke-break areas at work.  Spend time with people who do not smoke.  Lower the stress in your life. Stress can make you want to smoke. Try these things to lower stress:  Getting regular exercise.  Doing deep-breathing exercises.  Doing yoga.  Meditating.  What benefits will I see if I quit smoking?  Over time, you may have:  A better sense of smell and taste.  Less coughing and sore throat.  A slower heart rate.  Lower blood pressure.  Clearer skin.  Better breathing.  Fewer sick days.  Summary  Quitting smoking can be hard, but it is one of the best things that you can do for your health.  Do not give up if you cannot quit the first time. Some people need to try many times to quit.  When you decide to quit smoking, make a plan to help you succeed.  Quit smoking right away, not slowly over a period of time.  When you start quitting, get help and support to keep you smoke-free.    This information is not intended to replace advice given to you by your health care provider. Make sure you discuss any questions you have with your health care provider.  Document Revised: 12/09/2022 Document Reviewed: 12/09/2022  Elsevier Patient Education © 2024 Elsevier Inc.

## 2025-03-17 NOTE — PROGRESS NOTES
"Chief Complaint  Post-op    Subjective        Didi Ceballos presents to [facility] VASCULAR SURGERY  Carotid Artery Disease  Post-op    Couple week follow-up after left transcarotid arterial stent placement.  Suffered no complication.  Doing well.  No focal motor neurologic deficits.  Still smoking    Objective   Vital Signs:  /86   Pulse 86   Ht 157.5 cm (62.01\")   Wt 64.4 kg (142 lb)   SpO2 96%   BMI 25.97 kg/m²   Estimated body mass index is 25.97 kg/m² as calculated from the following:    Height as of this encounter: 157.5 cm (62.01\").    Weight as of this encounter: 64.4 kg (142 lb).     BMI is >= 25 and <30. (Overweight) The following options were offered after discussion;: weight loss educational material (shared in after visit summary)    Didi Ceballos  reports that she has been smoking cigarettes. She started smoking about 2 months ago. She has a 18.1 pack-year smoking history. She has been exposed to tobacco smoke. She has never used smokeless tobacco. I have educated her on the risk of diseases from using tobacco products such as arterial disease.     I advised her to quit and she is not willing to quit.    I spent 3  minutes counseling the patient.         Physical Exam  Constitutional:       Appearance: She is well-developed.   Pulmonary:      Effort: Pulmonary effort is normal. No respiratory distress.   Abdominal:      General: There is no distension.      Palpations: Abdomen is soft.   Neurological:      Mental Status: She is alert and oriented to person, place, and time.     Well-healed left cervical incision from her transcarotid arterial stent placement.      Result Review :    The following data was reviewed by: Dhruv Luna MD on 03/17/25  CBC          2/24/2025    10:17   CBC   WBC 6.24    RBC 5.34    Hemoglobin 16.6    Hematocrit 50.0    MCV 93.6    MCH 31.1    MCHC 33.2    RDW 12.5    Platelets 219       BMP          2/24/2025    10:17   BMP   BUN 18  "   Creatinine 0.79    Sodium 138    Potassium 4.4    Chloride 103    CO2 24.4    Calcium 9.7       A1C Last 3 Results          2/24/2025    10:17   HGBA1C Last 3 Results   Hemoglobin A1C 5.92        Data reviewed : Cardiology studies as below  Vascular Surgical History:  3/5/2025: Left transcarotid arterial stent placement (NTS)      Vascular Imaging History:  Triple screening study:  6/11/2024:  Left side 273/94 with ratio 5.4    CT angiogram of the head and neck:  12/23/2024:  Focal high-grade narrowing over a short segment near the left ICA origin, approximately 80% stenosis. No additional high-grade narrowing, large vessel occlusion or aneurysm in the head and neck.       (Text in bold font has been individually reviewed by myself and confirmed)         Assessment and Plan     Vascular surgery following for:  Carotid stenosis    Diagnoses and all orders for this visit:    1. Carotid stenosis, bilateral (Primary)  -     Duplex Carotid Ultrasound CAR; Future    2. Cigarette smoker    3. Presence of internal carotid stent      Patient doing quite well after left TCAR.  Incision appears in good order.  She suffered no consequences or complications.  She is on good medical therapy.  Will see her back in 3 months time for carotid duplexes.      She is currently smoking.  We discussed the need for tobacco cessation      Vascular medical management: Patient should continue aspirin 81 mg daily, Plavix 75 mg daily, and Crestor 10 mg daily for  Return in about 3 months (around 6/17/2025), or if symptoms worsen or fail to improve, for Follow up with vascular ultrasound.  Patient was given instructions and counseling regarding her condition or for health maintenance advice. Please see specific information pulled into the AVS if appropriate.

## 2025-05-12 ENCOUNTER — TELEPHONE (OUTPATIENT)
Age: 80
End: 2025-05-12
Payer: MEDICARE

## 2025-05-12 NOTE — TELEPHONE ENCOUNTER
Having hip replacement surgery the end of May and her Ortho wants her to stop her plavix 4 days prior.  Is this ok.

## 2025-06-23 ENCOUNTER — OFFICE VISIT (OUTPATIENT)
Age: 80
End: 2025-06-23
Payer: MEDICARE

## 2025-06-23 ENCOUNTER — HOSPITAL ENCOUNTER (OUTPATIENT)
Dept: CARDIOLOGY | Facility: HOSPITAL | Age: 80
Discharge: HOME | End: 2025-06-23
Admitting: SURGERY
Payer: MEDICARE

## 2025-06-23 VITALS
DIASTOLIC BLOOD PRESSURE: 71 MMHG | BODY MASS INDEX: 26.57 KG/M2 | HEIGHT: 62 IN | SYSTOLIC BLOOD PRESSURE: 125 MMHG | WEIGHT: 144.4 LBS

## 2025-06-23 DIAGNOSIS — I65.23 CAROTID STENOSIS, BILATERAL: Primary | ICD-10-CM

## 2025-06-23 DIAGNOSIS — Z72.0 CURRENT TOBACCO USE: ICD-10-CM

## 2025-06-23 DIAGNOSIS — I65.23 CAROTID STENOSIS, BILATERAL: ICD-10-CM

## 2025-06-23 DIAGNOSIS — E78.5 DYSLIPIDEMIA: ICD-10-CM

## 2025-06-23 DIAGNOSIS — I10 PRIMARY HYPERTENSION: ICD-10-CM

## 2025-06-23 DIAGNOSIS — E66.3 OVERWEIGHT (BMI 25.0-29.9): ICD-10-CM

## 2025-06-23 LAB
BH CV LEFT CCA HIDDEN LRR: 1 CM/S
BH CV MID LEFT ICA HIDDEN LRR: 1 CM
BH CV VAS CAROTID LEFT DISTAL STENT EDV: 16 CM/S
BH CV VAS CAROTID LEFT DISTAL STENT: 49 CM/S
BH CV VAS CAROTID LEFT DISTAL TO STENT EDV: 20 CM/S
BH CV VAS CAROTID LEFT DISTAL TO STENT: 76 CM/S
BH CV VAS CAROTID LEFT MID STENT EDV: 23 CM/S
BH CV VAS CAROTID LEFT MID STENT: 76 CM/S
BH CV VAS CAROTID LEFT PROXIMAL STENT EDV: 16 CM/S
BH CV VAS CAROTID LEFT PROXIMAL STENT: 55 CM/S
BH CV VAS CAROTID LEFT PROXIMAL TO STENT: 70 CM/S
BH CV VAS CAROTID LEFT STENT NATIVE VESSEL PROXIMAL ED: 23 CM/S
BH CV XLRA MEAS LEFT DIST ICA EDV: -32.9 CM/SEC
BH CV XLRA MEAS LEFT DIST ICA PSV: -98.2 CM/SEC
BH CV XLRA MEAS LEFT ICA/CCA RATIO: 1.5
BH CV XLRA MEAS LEFT PROX CCA EDV: 17.4 CM/SEC
BH CV XLRA MEAS LEFT PROX CCA PSV: 67.7 CM/SEC
BH CV XLRA MEAS LEFT PROX ECA PSV: -90.7 CM/SEC
BH CV XLRA MEAS LEFT PROX SCLA PSV: -149 CM/SEC
BH CV XLRA MEAS LEFT VERTEBRAL A PSV: -34.8 CM/SEC
BH CV XLRA MEAS RIGHT DIST CCA EDV: 19.9 CM/SEC
BH CV XLRA MEAS RIGHT DIST CCA PSV: 59.6 CM/SEC
BH CV XLRA MEAS RIGHT DIST ICA EDV: -23.9 CM/SEC
BH CV XLRA MEAS RIGHT DIST ICA PSV: -85 CM/SEC
BH CV XLRA MEAS RIGHT ICA/CCA RATIO: 1.1
BH CV XLRA MEAS RIGHT PROX CCA EDV: 19.9 CM/SEC
BH CV XLRA MEAS RIGHT PROX CCA PSV: 74.6 CM/SEC
BH CV XLRA MEAS RIGHT PROX ECA PSV: -96.3 CM/SEC
BH CV XLRA MEAS RIGHT PROX ICA EDV: -19.9 CM/SEC
BH CV XLRA MEAS RIGHT PROX ICA PSV: -54 CM/SEC
BH CV XLRA MEAS RIGHT PROX SCLA PSV: -95.1 CM/SEC
BH CV XLRA MEAS RIGHT VERTEBRAL A PSV: -39.8 CM/SEC
BH CVPROX LEFT ICA HIDDEN LRR: 1 CM

## 2025-06-23 PROCEDURE — 3074F SYST BP LT 130 MM HG: CPT | Performed by: NURSE PRACTITIONER

## 2025-06-23 PROCEDURE — 99214 OFFICE O/P EST MOD 30 MIN: CPT | Performed by: NURSE PRACTITIONER

## 2025-06-23 PROCEDURE — 93880 EXTRACRANIAL BILAT STUDY: CPT

## 2025-06-23 PROCEDURE — 1159F MED LIST DOCD IN RCRD: CPT | Performed by: NURSE PRACTITIONER

## 2025-06-23 PROCEDURE — 1160F RVW MEDS BY RX/DR IN RCRD: CPT | Performed by: NURSE PRACTITIONER

## 2025-06-23 PROCEDURE — 3078F DIAST BP <80 MM HG: CPT | Performed by: NURSE PRACTITIONER

## 2025-06-23 PROCEDURE — 93880 EXTRACRANIAL BILAT STUDY: CPT | Performed by: SURGERY

## 2025-06-23 NOTE — PROGRESS NOTES
"[facility] VASCULAR SURGERY    Chief Complaint  Carotid Artery Disease    Subjective          History of Present Illness  Didi Ceballos is a 79 y.o. female with carotid stenosis. She presents to the office today for follow up. She is followed by Dr. Luna. She has undergone the following vascular surgery interventions:    Left TCAR on 3/5/2025    She tells me since we last saw her she went underwent a total right hip replacement.  She is recovering from this nicely.  She denies any symptoms of TIA/CVA, including amaurosis fugax, slurred speech, or unilateral paresthesias or paralysis. She is maintained on aspirin, Plavix, and a statin. She reports compliance with her medications. She is a former smoker.    Review of Systems   Neurological:  Negative for facial asymmetry, speech difficulty and weakness.          Allergies: Patient has no known allergies.    Current Outpatient Medications   Medication Instructions    aspirin 81 mg, Daily    clopidogrel (PLAVIX) 75 mg, Oral, Daily    metoprolol succinate XL (TOPROL-XL) 25 mg, Oral, Daily    rosuvastatin (CRESTOR) 10 mg, Oral, Daily          Objective   Vital Signs:  /71 (BP Location: Left arm)   Ht 157.5 cm (62.01\")   Wt 65.5 kg (144 lb 6.4 oz)   BMI 26.40 kg/m²   Estimated body mass index is 26.4 kg/m² as calculated from the following:    Height as of this encounter: 157.5 cm (62.01\").    Weight as of this encounter: 65.5 kg (144 lb 6.4 oz).       Physical Exam  Vitals reviewed.   Constitutional:       General: She is not in acute distress.     Appearance: She is not ill-appearing.   Cardiovascular:      Rate and Rhythm: Normal rate.      Pulses:           Radial pulses are 2+ on the right side and 2+ on the left side.   Pulmonary:      Effort: Pulmonary effort is normal. No respiratory distress.   Skin:     General: Skin is warm and dry.   Neurological:      General: No focal deficit present.      Mental Status: She is alert and oriented " to person, place, and time.      GCS: GCS eye subscore is 4. GCS verbal subscore is 5. GCS motor subscore is 6.       Result Review :    The following data was reviewed by: FRANDY Fried on 06/23/2025:    Duplex Carotid Ultrasound CAR (06/23/2025 13:40)   Right: Less than 50% stenosis  Left: Patent left ICA stent, no evidence of in-stent restenosis    I reviewed the office note by NARINDER De La Cruz from 6/11/2025   I reviewed the office note by Dr. Kayleigh Joe from 5/7/2025      Assessment and Plan     Diagnoses and all orders for this visit:    1. Carotid stenosis, bilateral (Primary)  -     Duplex Carotid Ultrasound CAR; Future    2. Primary hypertension    3. Dyslipidemia    4. Current tobacco use    5. Overweight (BMI 25.0-29.9)    BMI is >= 25 and <30. (Overweight) The following options were offered after discussion;: Information on healthy weight added to patient's after visit summary.            Didi Ceballos is a 79 y.o. female with carotid stenosis. She denies symptoms of TIA/CVA. Her most recent carotid duplex shows less than 50% stenosis in the right ICA and a patent left carotid stent with no evidence of in-stent restenosis, antegrade flow in the bilateral vertebral arteries.  Surgery is not warranted unless stenosis reaches or exceeds 70% or patient becomes symptomatic.  We reviewed the signs and symptoms of stroke, she was instructed to seek emergency medical care if she experiences any of these. She verbalizes understanding. She is maintained on appropriate antiplatelet, statin, and antihypertensive medications which we recommend she continue. I will plan to see her in the office in 6 months with a carotid duplex. She was instructed to call our office if she had any questions or concerns.     Follow Up     Return in about 6 months (around 12/23/2025) for Carotid duplex.    Patient was given instructions and counseling regarding her condition or for health maintenance advice. Please see  specific information pulled into the AVS if appropriate.     Isabella Chew, APRN

## 2025-08-12 ENCOUNTER — OFFICE VISIT (OUTPATIENT)
Dept: CARDIOLOGY | Facility: CLINIC | Age: 80
End: 2025-08-12
Payer: MEDICARE

## 2025-08-12 VITALS
BODY MASS INDEX: 27.05 KG/M2 | HEART RATE: 67 BPM | RESPIRATION RATE: 16 BRPM | DIASTOLIC BLOOD PRESSURE: 83 MMHG | WEIGHT: 147 LBS | HEIGHT: 62 IN | OXYGEN SATURATION: 97 % | SYSTOLIC BLOOD PRESSURE: 131 MMHG

## 2025-08-12 DIAGNOSIS — I25.10 CORONARY ARTERY DISEASE INVOLVING NATIVE CORONARY ARTERY OF NATIVE HEART WITHOUT ANGINA PECTORIS: Primary | ICD-10-CM

## 2025-08-12 DIAGNOSIS — I11.9 HYPERTENSIVE HEART DISEASE WITHOUT HEART FAILURE: ICD-10-CM

## 2025-08-12 DIAGNOSIS — Z79.02 LONG TERM CURRENT USE OF ANTITHROMBOTICS/ANTIPLATELETS: ICD-10-CM

## 2025-08-12 DIAGNOSIS — E78.00 HYPERCHOLESTEROLEMIA: ICD-10-CM

## (undated) DEVICE — UMBILICAL TAPE: Brand: DEROYAL

## (undated) DEVICE — SPNG GZ WOVN 4X4IN 12PLY 10/BX STRL

## (undated) DEVICE — MINI ACCESS KIT 4FR/SS GDWR: Brand: MEDLINE INDUSTRIES, INC.

## (undated) DEVICE — GW TORQFLX SS .018IN 40CM

## (undated) DEVICE — SYR LUERLOK 20CC BX/50

## (undated) DEVICE — CVR PROB 96IN LF STRL

## (undated) DEVICE — GOWN,SIRUS,POLYRNF,BRTHSLV,XLN/XXL,18/CS: Brand: MEDLINE

## (undated) DEVICE — APPL CHLORAPREP HI/LITE 26ML ORNG

## (undated) DEVICE — BLANKT WARM UNDER/BDY FUL/ACC A/ 90X206CM

## (undated) DEVICE — SNAP KOVER: Brand: UNBRANDED

## (undated) DEVICE — SHLD ANGIO 2LAYR CIR FEN

## (undated) DEVICE — VESSEL LOOPS X-RAY DETECTABLE: Brand: DEROYAL

## (undated) DEVICE — TRAP FLD MINIVAC MEGADYNE 100ML

## (undated) DEVICE — SUT PROLN 5/0 RB1 D/A 36IN 8556H

## (undated) DEVICE — 4MM X 25MM: Brand: ENROUTE ENFLATE TRANSCAROTID RX BALLOON DILATION CATHETER

## (undated) DEVICE — GW ENROUTE HC .014IN 95CM

## (undated) DEVICE — NDL HYPO PRECISIONGLIDE REG 25G 1 1/2

## (undated) DEVICE — MICRO TIP WIPE: Brand: DEVON

## (undated) DEVICE — TOWEL,OR,DSP,ST,BLUE,STD,4/PK,20PK/CS: Brand: MEDLINE

## (undated) DEVICE — GLV SURG BIOGEL LTX PF 8 1/2

## (undated) DEVICE — SYR LUERLOK 5CC

## (undated) DEVICE — ST ACC MICROPUNCTURE STFF/CANN PLAT/TP 4F 21G 40CM

## (undated) DEVICE — ANGLED-TIP ARTERIAL SHEATH CONFIGURATION: Brand: ENROUTE TRANSCAROTID NEUROPROTECTION SYSTEM

## (undated) DEVICE — SNAP KAP: Brand: UNBRANDED

## (undated) DEVICE — DEV INFL COMPAK W/ACCESSPLUS IN4530

## (undated) DEVICE — SYR LUERLOK 30CC

## (undated) DEVICE — CATHETER,URETHRAL,REDRUBBER,STERILE,20FR: Brand: MEDLINE

## (undated) DEVICE — MICRO HVTSA, 0.5G AND HVTSA SOURCEMARK PRODUCT CODE M1206 AND M1206-01: Brand: EXOFIN MICRO HVTSA, 0.5G

## (undated) DEVICE — 3M™ IOBAN™ 2 ANTIMICROBIAL INCISE DRAPE 6640EZ: Brand: IOBAN™ 2

## (undated) DEVICE — ST ACC MICROPUNCTURE STFF .018 ECHO/PLDM/TP 4F/10CM 21G/7CM

## (undated) DEVICE — SHLD RAD SCATPAD SCATTER SUBCLAVIAN DBL TP 12.5X16.5

## (undated) DEVICE — KT INTRO MIC TROC 4F 21GA 7CM KT/EA/1

## (undated) DEVICE — SUT PROLN 6/0 BV1 D/A 30IN 8709H

## (undated) DEVICE — RADIFOCUS GLIDEWIRE: Brand: GLIDEWIRE

## (undated) DEVICE — EXTENSION SET, MALE LUER LOCK ADAPTER WITH RETRACTABLE COLLAR

## (undated) DEVICE — SUT VIC 4/0 SH 27IN J415H

## (undated) DEVICE — PENCL SMOKE/EVAC MEGADYNE TELESCP 10FT

## (undated) DEVICE — SYR LL TP 10ML STRL

## (undated) DEVICE — SYS NEUROPROTECTION TRANSCAROTID ENROUTE PLS W/GW/0.035IN

## (undated) DEVICE — PCH INST SURG INVISISHIELD 2PCKT

## (undated) DEVICE — RADIFOCUS TORQUE DEVICE MULTI-TORQUE VISE: Brand: RADIFOCUS TORQUE DEVICE

## (undated) DEVICE — PINNACLE INTRODUCER SHEATH: Brand: PINNACLE

## (undated) DEVICE — Device

## (undated) DEVICE — COVER,LIGHT HANDLE,FLX,1/PK: Brand: MEDLINE INDUSTRIES, INC.

## (undated) DEVICE — THE STERILE CAMERA HANDLE COVER IS FOR USE WITH THE STERIS SURGICAL LIGHTING AND VISUALIZATION SYSTEMS.

## (undated) DEVICE — EQUIPMENT COVER BAG TYPE 48” X 36” (122CM X 91CM): Brand: EQUIPMENT COVER BAG TYPE

## (undated) DEVICE — DRAPE,LAPAROTOMY,PCH,STERILE: Brand: MEDLINE

## (undated) DEVICE — CONTAINER,SPECIMEN,OR STERILE,4OZ: Brand: MEDLINE

## (undated) DEVICE — UNDERGLV SURG BIOGEL INDICAT PI SZ8.5 BLU

## (undated) DEVICE — INFLATION DEVICE: Brand: ENCORE™ 26

## (undated) DEVICE — STPCK 3/WY HP M/RA W/OFF/HNDL 1050PSI STRL

## (undated) DEVICE — SUT POLY BR TP 2STRND 1/8X30IN

## (undated) DEVICE — VASCULAR CDS: Brand: MEDLINE INDUSTRIES, INC.

## (undated) DEVICE — THE STERILE LIGHT HANDLE COVER IS USED WITH STERIS SURGICAL LIGHTING AND VISUALIZATION SYSTEMS.

## (undated) DEVICE — SOL NACL 0.9PCT 1000ML

## (undated) DEVICE — DRSNG SURESITE WNDW 4X4.5